# Patient Record
Sex: FEMALE | Race: BLACK OR AFRICAN AMERICAN
[De-identification: names, ages, dates, MRNs, and addresses within clinical notes are randomized per-mention and may not be internally consistent; named-entity substitution may affect disease eponyms.]

---

## 2020-08-18 ENCOUNTER — HOSPITAL ENCOUNTER (EMERGENCY)
Dept: HOSPITAL 88 - FSED | Age: 30
Discharge: HOME | End: 2020-08-18
Payer: SELF-PAY

## 2020-08-18 VITALS — BODY MASS INDEX: 40.48 KG/M2 | HEIGHT: 62 IN | WEIGHT: 220 LBS

## 2020-08-18 DIAGNOSIS — S93.402A: Primary | ICD-10-CM

## 2020-08-18 DIAGNOSIS — S83.92XA: ICD-10-CM
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
## 2020-08-18 DIAGNOSIS — F17.210: ICD-10-CM

## 2020-08-18 DIAGNOSIS — I10: ICD-10-CM
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
## 2020-08-18 DIAGNOSIS — G40.909: ICD-10-CM

## 2020-08-18 PROCEDURE — 99283 EMERGENCY DEPT VISIT LOW MDM: CPT

## 2020-08-18 NOTE — XMS REPORT
Continuity of Care Document

                             Created on: 2020



JERMAINE MARLEY

External Reference #: [id number]

: 1990

Sex: Female



Demographics





                          Address                   [address] 

[place]  [address]

 

                          Home Phone                [phone]

 

                          Preferred Language        English

 

                          Marital Status            Single

 

                          Taoism Affiliation     None

 

                          Race                      

 

                          Ethnic Group              Non-





Author





                          Author                    [facility]

t

 

                          Organization              [facility]

 

                          Address                   [address]

[place]  [address]



 

                          Phone                     Unavailable







Care Team Providers





                    Care Team Member Name Role                Phone

 

                    BRENDEN Sanders Elvin Attphys             [phone]

 

                    Frank Burnett Attphys             [phone]

 

                    OB/MD,  HIGH        Attphys             Unavailable

 

                    WOMENS,  ROOM-1     Attphys             Unavailable

 

                    HPB-MATERNAL,  PHYSICIANS Attphys             Unavailable

 

                    OB/GYN,  ROOM2      Attphys             Unavailable

 

                    BRENDEN Sanders Elvin Admphys             [phone]







Problems





           Condition Name Condition Details Condition Category Status     Onset 

Date Resolution

Date            Last Treatment Date Treating Clinician Comments        Source

 

                          ELEVATED BP                                       ELEV

ATED BP                        Active   

                    2017                                                  
                                             [facility]            
        Diagnosis Active  2017 00:00:00         2017 21:50:00       

          [facility]

 

                          BLOOD PRESSURE                                    BLOO

D PRESSURE                        

Active                        2017                                        
                                                       [facility]  
                  Diagnosis Active    2017 00:00:00           2017 2

3:32:00            

                                        [facility]

 

                    History of High-risk pregnancy History of High-risk pregnanc

y Problem HL7.CCDAR2

          Resolved                                                    [facility]

 

       History of syphilis History of syphilis Problem HL7.CCDAR2 Resolved      

                              

[facility]

 

       Seizure disorder Seizure disorder Problem HL7.CCDAR2 Active              

                      [facility]

 

       Syphilis, antepartum Syphilis, antepartum Problem HL7.CCDAR2 Active      

                              

[facility]

 

       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          

                          

[facility]

 

       Postpartum exam Postpartum exam Problem HL7.CCDAR2 Active                

                    [facility]

 

        Insertion of Nexplanon Insertion of Nexplanon Problem HL7.CCDAR2 Active 

                                 

                                        [facility]

 

       Postop check Postop check Problem HL7.CCDAR2 Active                      

              [facility]

 

                          Pregnancy with abortive outcome (disorder)            

             Pregnancy 

with abortive outcome (disorder)                        Resolved                
                               Problem                        04/10/2017        
                                       [facility]                  
        Problem Resolved                 2017-04-10 01:45:11                 Juan F Avalos

 

                          Finding of increased blood pressure (finding)         

                Finding of

increased blood pressure (finding)                        Active                
                               Problem                        04/10/2017        
                                       [facility]                  
        Problem Active                  2017-04-10 01:45:11                 Neftaly Avalos

 

                          Pre-eclampsia added to pre-existing hypertension (diso

rder)                     

   Pre-eclampsia added to pre-existing hypertension (disorder)                  
     Active                                                Problem              
         04/10/2017                                                [facility]                     Problem Active                  2017-04-10 01

:45:11                 

Kamila Avalos

 

                                        Gestational [pregnancy-induced] hyperten

hang without significant proteinuria, 

unspecified trimester                                           Gestational [pre

gnancy-induced] 

hypertension without significant proteinuria, unspecified trimester             
                                  2017                                    
           04/10/2017                                                [facility]                     Problem                         2017 05

:00:00 2017-04-10 

01:45:11        2017-04-10 01:45:11                                 Kamila so

 

                          Discharge Diagnosis: 33 weeks gestation of pregnancy  

                       

Discharge Diagnosis: 33 weeks gestation of pregnancy                            
                   2017                                                [facility]                     Problem                   2017 06:00:00 2017 03:52:13 

2017 03:52:13                                         Kamila Avalos







History of Past Illness





           Condition Name Condition Details Condition Category Status     Onset 

Date Resolution

Date            Last Treatment Date Treating Clinician Comments        Source

 

                          Patient currently pregnant (finding)                  

       Patient currently 

pregnant (finding)                        Resolved                        
2008                        Problem                        04/10/2017     
                                          [facility]               
             Problem      Resolved     2008 00:00:00 2017-04-10 01:45:11 2

017-04-10 

01:45:11                                                    Kamila Avalos

 

                          Seizure (finding)                                 Seiz

ure (finding)                     

  Resolved                        2004                        Problem     
                  04/10/2017                                                [facility]                     Problem         Resolved        2004 00:00:00 

2017-04-10 01:45:11 2017-04-10 01:45:11                                 Kamila Avalos







Allergies, Adverse Reactions, Alerts

This patient has no known allergies or adverse reactions.



Social History





           Social Habit Start Date Stop Date  Quantity   Comments   Source

 

           Social History 2017 18:49:53 2017 18:49:53               

        Kamila Avalos







                Smoking Status  Start Date      Stop Date       Source

 

                Never smoker                                    [facility]







Medications





             Ordered Medication Name Filled Medication Name Start Date   Stop Da

te    Current 

Medication? Ordering Clinician Indication Dosage     Frequency  Signature (SIG) 

Comments                  Components                Source

 

          ferrous sulfate 325 mg oral enteric coated tablet           2017

 12:51:30           Yes                 

                                 325 mg = 1 tab, PO, Daily, # 60 tab, 3 Refill(s

)                       [facility]

 

       gabapentin 100 MG Oral Capsule        2017 12:51:00        Yes     

                           200 mg = 2 

cap, PO, Q6H, # 20 cap, 0 Refill(s)                                         Neftaly Avalos

 

                Prenatal Multivitamins with Folic Acid 0.4 mg oral tablet       

          2017 12:51:00 

        Yes                                     1 tab, PO, Daily, # 40 tab, 0 Re

fill(s)                 [facility]

 

       ibuprofen 800 mg oral tablet        2017 12:51:00        Yes       

                         800 mg = 1 tab,

PO, Q8H, # 30 tab, 0 Refill(s)                                         [facility]

 

       Docusate Sodium 100 MG Oral Capsule        2017 12:51:00        Yes

                                100 mg =

1 cap, PO, BID, PRN Constipation, # 30 cap, 0 Refill(s)                         

                [facility]

 

                                        Acetaminophen 300 MG / Codeine Phosphate

 30 MG Oral Tablet [Tylenol with Codeine

 #3]          2017 12:51:00        Yes                                2 ta

b, PO, Q6H, # 24 tab, 0 Refill(s)  

                                                    [facility]

 

          ferrous sulfate 325 mg oral enteric coated tablet           2017

 11:17:00           No                   

                                 325 mg = 1 tab, PO, Daily, # 60 tab, 3 Refill(s

)                       [facility]

 

                Prenatal Multivitamins with Folic Acid 0.4 mg oral tablet       

          2017 11:17:00 

        No                                      1 tab, PO, Daily, # 40 tab, 0 Re

fill(s)                 [facility]

 

       ibuprofen 800 mg oral tablet        2017 11:17:00        No        

                         800 mg = 1 tab, 

PO, Q8H, # 30 tab, 0 Refill(s)                                         [facility]

 

       Docusate Sodium 100 MG Oral Capsule        2017 11:17:00        No 

                                100 mg = 

1 cap, PO, BID, PRN Constipation, # 30 cap, 0 Refill(s)                         

                [facility]

 

                                        Acetaminophen 300 MG / Codeine Phosphate

 30 MG Oral Tablet [Tylenol with Codeine

 #3]        2017 11:17:00       No                            2 tab, PO, Q

6H, # 24 tab, 0 Refill(s)       

                                        [facility]

 

                    24 HR tramadol hydrochloride 100 MG Extended Release Tablet 

                    2017 

20:50:00        No                                 Notes: Not to exceed 400mg/da

y. (Same As: Ultram)               

Kamila Avalos

 

       Prenatal Multivitamins oral tablet        2017 14:00:00        No  

                               1 tab, 

Route: PO, Drug Form: TAB, Dosing Weight 95.455, kg, Daily, Start date: 17
9:00:00 CDT, Duration: 30 day, Stop date: 17 9:00:00 CDT                  

                       Kamila Avalos

 

       Ibuprofen        2017 13:00:00        No                           

      Notes: (Same as: Motrin) "Do Not 

Crush"  Take with food.                                         [facility]

 

       Depo-Provera        2017 13:00:00        Yes                       

         Notes: (Same as: Depo-Provera) 

This is NOT Depo-SubQ Provera 104  For IM use only   *** MEDICATION WASTE *** 
Product Size:  150 mg Product Wasted:  ___ mg                                   

      [facility]

 

       Ibuprofen        2017 11:00:00        No                           

      Notes: (Same as: Motrin) "Do Not 

Crush"  Take with food.                                         [facility]

 

                                        Acetaminophen 300 MG / Codeine Phosphate

 30 MG Oral Tablet [Tylenol with Codeine

 #3]            2017 11:00:00         No                                  

    Notes: Do not exceed 4gm/day of 

acetaminophen.  (Same as: Tylenol with Codeine # 3)                             

            Kamila Avalos

 

       Penicillin G        2017 07:00:00        No                        

         2,500,000 unit, 50 mL, Route: 

IVPB, Drug form: INJ, ABXQ4H, Dosing Weight 95.455, kg, Start date: 17 
2:00:00 CDT, Duration: 30 day, Stop date: 17 22:00:00 CDT                 

                        [facility]

 

                                        0.5 ML Bordetella pertussis filamentous 

hemagglutinin vaccine, inactivated 0.01 

MG/ML / Bordetella pertussis fimbriae 2/3 vaccine, inactivated 0.01 MG/ML / 
Bordetella pertussis pertactin vaccine, inactivated 0.006 MG/ML / Bordetella 
pertussis toxoid vacci         2017 06:00:00         No                   

                   Notes: (Tdap ) For 

Adolecent and Adult use For IM Use.  Same as: Adacel (Tdap)                     

                    Kamila Avalos

 

       M-M-R II        2017 06:00:00        No                            

     Notes: (Same as: --R II)  

(measles-mumps-rubella virus vaccine 0.5 ml INJ VL)  WASTE: F/P - Red; E -Red  
GIVE PRIOR TO DISCHARGE                                         Kamial Avalos

 

                    Acetaminophen 325 MG / Hydrocodone Bitartrate 5 MG Oral Tabl

et                     2017 

05:20:00            No                                                Notes: (Sa

me as: Norco 325/5)  Do not exceed 4gm/day of 

acetaminophen.                                              [facility]

 

                    Acetaminophen 325 MG / Hydrocodone Bitartrate 10 MG Oral Tab

let                     2017 

05:20:00            No                                                Notes: Do 

not exceed 4gm/day of acetaminophen.  (Same as: 

Norco 325/10)                                               Kamila vAalos

 

       Bisacodyl        2017 05:20:00        No                           

      Notes: (Same As: Dulcolax, 

Bisco-Lax)                                                  [facility]

 

       Docusate        2017 05:20:00        No                            

     Notes: (Same as: Colace) (Do Not 

Crush)                                                      Kamila Avalos

 

     zolpidem      2017 05:20:00      No                       Notes: (Andrea

e As: Ambien)           

Kamila Avalos

 

       lanolin topical cream        2017 05:20:00        No               

                  1 appl, Route: TOP, 

PRN, Drug form: OINT, PRN Other -See Comment, Start date: 17 0:20:00 CDT, 
Duration: 30 day, Stop date: 17 0:19:00 CDT                               

          [facility]

 

       Benzocaine / Menthol        2017 05:20:00        No                

                 Notes: Cepacol lozenges 

Dispense 1 box = 16 lozenges  (Same As: Cepacol Lozenges)                       

                  [facility]

 

     Simethicone      2017 05:20:00      No                       Notes: (

Same as: Mylicon)           

[facility]

 

       Acetaminophen        2017 05:20:00        No                       

          Notes: Do not exceed 4 gm/day. 

(Same as: Tylenol)                                          Kamila Avalos

 

       NS-Oxytocin 30 units 500 ml 30 unit        2017 05:20:00        No 

                                30 unit, 

500 mL, Rate: 42 ml/hr, Infuse over: 11.9 hr, Dosing Weight 95.455, kg, Route: 
IV, Total Volume: 500 mL, Start date: 17 0:20:00 CDT, Duration: 2 day, 
Stop date: 17 0:19:00 CDT, Replace Every: 11.9 hr                         

                [facility]

 

       Lactated Ringers 1,000 mL        2017 05:20:00        No           

                      1,000 mL, Rate: 100

ml/hr, Infuse over: 10 hr, Route: IV, Dosing Weight 95.455 kg, Total Volume: 
1,000, Start date: 17 0:20:00 CDT, Duration: 30 day, Stop date: 17 
0:19:00 CDT                                                 [facility]

 

                                        Atropine Sulfate 0.025 MG / Diphenoxylat

e Hydrochloride 2.5 MG Oral Tablet 

[Lomotil]         2017 05:16:00         No                                

      Notes: (Same As: Lomotil) MAX Adult

 dose = 8 tabs/day                                          [facility]

 

       morphine Sulfate (GEORGES)        2017 05:14:00        No             

                    Route: INTRATHECAL, 

Drug form: INJ, ONCE, Stop date: 17 0:14:00 CDT                           

              [facility]

 

       fentaNYL (GEORGES)        2017 05:14:00        No                     

            Route: INTRATHECAL, Drug 

form: INJ, ONCE, Stop date: 17 0:14:00 CDT                                

         [facility]

 

       metoclopramide (GEORGES)        2017 05:14:00        No               

                  Route: IV, Drug form: 

INJ, ONCE, Stop date: 17 0:14:00 CDT                                      

   [facility]

 

       famotidine (GEORGES)        2017 05:14:00        No                   

              Route: IV, Drug form: INJ, 

ONCE, Stop date: 17 0:14:00 CDT                                         [facility]

 

       sodium citrate (GEORGES)        2017 05:14:00        No               

                  Route: PO, Drug Form: 

INJ, ONCE, Stop date: 17 0:14:00 CDT                                      

   [facility]

 

     ketOROLAC (GEORGES)      2017 05:14:00      No                       IV,

 ONCE           [facility]

 

       ondansetron (GEORGES)        2017 05:14:00        No                  

               Route: IV, Drug form: INJ,

 ONCE, Stop date: 17 0:14:00 CDT                                         [facility]

 

       acetaminophen (ANES)        2017 05:14:00        No                

                 Route: IV, Drug form: 

INJ, ONCE, Stop date: 17 0:14:00 CDT                                      

   [facility]

 

       bupivacaine 0.75% (ANES)        2017 05:14:00        No            

                     Route: INTRATHECAL, 

Drug Form: INJ, ONCE, Stop date: 17 0:14:00 CDT                           

              [facility]

 

       fentaNYL (ANES)        2017 05:04:00        No                     

            Route: IV, Drug form: INJ, 

ONCE, Stop date: 17 0:04:00 CDT                                         [facility]

 

       carboprost (ANES)        2017 04:59:00        No                   

              Route: IM, Drug form: INJ, 

ONCE, Stop date: 17 23:59:00 CDT                                         [facility]

 

       midazolam (ANES)        2017 04:59:00        No                    

             Route: IV, Drug form: SOLN, 

ONCE, Stop date: 17 23:59:00 CDT                                         [facility]

 

       sodium bicarbonate (ANES)        2017 04:44:00        No           

                      Route: EPIDURAL, 

Drug form: INJ, ONCE, Stop date: 17 23:44:00 CDT                          

               [facility]

 

       [facility]        2017 04:44:00        No                     

            Route: IV, Drug form: INJ, 

ONCE, Stop date: 17 23:44:00 CDT                                         [facility]

 

       lidocaine (ANES)        2017 04:39:00        No                    

             Route: EPIDURAL, Drug form: 

INJ, ONCE, Stop date: 17 23:39:00 CDT                                     

    [facility]

 

       ceFAZolin (ANES)        2017 04:34:00        No                    

             Route: IV, Drug form: INJ, 

ONCE, Stop date: 17 23:34:00 CDT                                         [facility]

 

       Pitocin ([facility]) ([facility]) +        2017 04:20:00        No             

                    Route: IV, Drug form:

 SOLN, Dosing Weight 95.5, kg, Start date: 17 23:20:00 CDT, Stop date: 
17 0:20:00 CDT                                         [facility]

 

     Cefazolin      2017 04:00:00      No                       Notes: Andrea bustamante as: Ancef           [facility] 

[facility]

 

      Azithromycin       2017 04:00:00       No                           

 Notes: (Same As: Zithromax IV)  

                                                    Kam[facility] Avalos

 

       LR 1000 mL INJ (ANES)        2017 03:44:00        No               

                  Route: IV, Total 

Volume: 1,000, Start date: 17 22:44:00 CDT, Stop date: 17 23:44:00 
CDT                                                         [facility]

 

       Ondansetron        2017 03:26:00        No                         

        Notes: (Same as: Zofran)   *** 

MEDICATION WASTE *** Product Size:  4 mg Product Wasted:  ___ mg                

                         [facility]

 

      Morphine       2017 03:26:00       No                            Not

es: (Same as:MORPhine Sulfate)       

                                        [facility]

 

                Penicillin G Potassium [id number] UNT/ML Injectable Solution       

          2017 03:00:00 

           No                                                     Notes: (Same a

s: Pfizerpen)   *** MEDICATION WASTE *** Product 

Size:  5,000,000 unit Product Wasted:  ___ unit                                 

        [facility]

 

                                        Calcium Chloride 0.002 MEQ/ML / Glucose 

50 MG/ML / Potassium Chloride 0.004 

MEQ/ML / Sodium Chloride 0.147 MEQ/ML Injectable Solution                     20

04 22:04:00 

           No                                                     1,000 mL, Rate

: 999 ml/hr, Infuse over: 1 hr, Route: IV, Dosing 

Weight 95.455 kg, Total Volume: 1,000, Start date: 17 17:04:00 CDT, 
Duration: 1 doses or times, Stop date: 17 18:03:00 CDT                    

                     [facility]

 

       influenza virus vaccine, inactivated        2017 14:00:00        Alistair

s                                Notes: 

(Same as: Fluzone Quadrivalent, Fluarix Quadrivalent)  For 3 years of age and 
older (0.5 mL IM) Shake well before use                                         

[facility]

 

       Fioricet        2017 05:51:00        No                            

     Notes: 

(acetaminophen-butalbital-caffeine 325-50-40mg)  Do not exceed 4 gm/day of 
acetaminophen.  (Same as: Esgic, Fioricet)                                      

   Kamila Avalos

 

       Ofirmev        2017 02:24:00        No                             

    Notes: Infuse over 15 minutes  Do not

 exceed 4gm/day of acetaminophen    *** MEDICATION WASTE *** Product Size:  1000
 mg Product Wasted:  ___ mg                                         Kamila so

 

       NS-Oxytocin 30 units 500 ml 30 unit        2017 23:41:00        No 

                                30 unit, 

500 mL, Rate: Titrate, Dosing Weight 95.455, kg, Route: IV, Total Volume: 500 
mL, Start date: 17 18:41:00 CDT, Duration: 2 day, Stop date: 17 
18:40:00 CDT, Replace Every: 24 hr                                         Janette Avalos

 

       oxytocin 30 unit        2017 23:31:00        No                    

             30 unit, 500 mL, Rate: 

Titrate, Dosing Weight 95.455, kg, Route: IV, Total Volume: 500 mL, Start date: 
17 18:31:00 CDT, Duration: 2 day, Stop date: 17 18:30:00 CDT, 
Replace Every: 24 hr                                         [facility]

 

       Famotidine 20 MG Oral Tablet [Pepcid]        2017 22:00:00        N

o                                 Notes: 

(Same as: Pepcid)                                           [facility]

 

       Misoprostol        2017 19:00:00        No                         

        Notes: (Same as:Cytotec)   Take 

with food                                                   [facility]

 

     Carboprost      2017 19:00:00      No                       Notes: (S

maddi As: Hemabate)           

[facility]

 

       Famotidine        2017 19:00:00        No                          

       Notes: (Same as: Pepcid) Can be 

dilute in 5-10cc NS  IVP: Slow IV push over at least 2 minutes.                 

                        [facility]

 

       Citric Acid / sodium citrate        2017 19:00:00        No        

                         Notes: (Same As:

 Bicitra, Cytra-2) Sodium citrate-citric acid (500-334 mg/5 mL): 1 mL contains 
sodium 1 mEq/mL and bicarbonate 1 mEq/mL                                        

 [facility]

 

      Methylergonovine       2017 19:00:00       No                       

     Notes: (Same as:Methergine) 

                                                    [facility]

 

       Misoprostol        2017 18:57:00        No                         

        Notes: (Same as:Cytotec)  Take 

with food  25 microgram = 1/4 tab of 100 microgram.                             

            Kamila Avalos

 

       GI cocktail        2017 18:19:00        No                         

        Notes: G.I. Cocktail = antacid 

with simethicone 22.5 mL - lidocaine viscous 7.5 mL                             

            Kamila Avalos

 

       Ondansetron        2017 18:03:00        No                         

        Notes: (Same as: Zofran)   *** 

MEDICATION WASTE *** Product Size:  4 mg Product Wasted:  ___ mg                

                         Kamila Avalos

 

       Tramadol        2017 18:03:00        No                            

     Notes: Not to exceed 400mg/day. 

(Same As: Ultram)                                           Kamila Avalos

 

       Ibuprofen        2017 18:03:00        No                           

      Notes: (Same as: Motrin) "Do Not 

Crush"  Take with food.                                         Kamila Avalos

 

       Butorphanol        2017 18:03:00        No                         

        Notes: (Same As: Stadol)   *** 

MEDICATION WASTE *** Product Size:  2 mg Product Wasted:  ___ mg                

                         Kamila Avalos

 

           Lidocaine Hydrochloride 10 MG/ML Injectable Solution             18:03:00            No         

                                        Notes: (Same as: Xylocaine)             

        Kamila Avalos

 

       Terbutaline        2017 18:03:00        No                         

        Notes: **DO NOT USE IN OB/GYN 

AREA**  (Same As: Brethine)                                         Kamila so

 

       Lactated Ringers 1,000 mL        2017 18:03:00        No           

                      1,000 mL, Rate: 125

 ml/hr, Infuse over: 8 hr, Route: IV, Dosing Weight 95.455 kg, Total Volume: 
1,000, Start date: 17 13:03:00 CDT, Duration: 30 day, Stop date: 17 
13:02:00 CDT                                                Kamila Avalos

 

       NS-Oxytocin 30 units 500 ml 30 unit        2017 18:03:00        No 

                                30 unit, 

500 mL, Rate: 42 ml/hr, Infuse over: 11.9 hr, Dosing Weight 95.455, kg, Route: 
IV, Total Volume: 500 mL, Start date: 17 13:03:00 CDT, Duration: 2 day, 
Stop date: 17 13:02:00 CDT, Replace Every: 11.9 hr                        

                 [facility]

 

                                        Calcium Chloride 0.0014 MEQ/ML / Potassi

um Chloride 0.004 MEQ/ML / Sodium 

Chloride 0.103 MEQ/ML / Sodium Lactate 0.028 MEQ/ML Injectable Solution         

                  

2017 18:03:00           No                                                

1,000 mL, 1,000 ml/hr, Infuse Over: 1 hr, 

Route: IV, 1,000, Drug form: INJ, ONCE, Dosing Weight 95.455 kg, Start date: 
17 13:03:00 CDT, Stop date: 17 13:03:00 CDT, Bolus for regional anes
thesia per unit protocol                                         Kamila valverde

 

       Fioricet        2017 05:11:00        No                            

     Notes: 

(acetaminophen-butalbital-caffeine 325-50-40mg)  Do not exceed 4 gm/day of 
acetaminophen.  (Same as: Esgic, Fioricet)                                      

   [facility]

 

                                        Calcium Chloride 0.0014 MEQ/ML / Potassi

um Chloride 0.004 MEQ/ML / Sodium 

Chloride 0.103 MEQ/ML / Sodium Lactate 0.028 MEQ/ML Injectable Solution         

                  

2017 05:10:00           No                                                

1,000 mL, 1,000 ml/hr, Infuse Over: 1 hr, 

Route: IV, ONCE, Priority: STAT, Dosing Weight 92.727 kg, Start date: 17 
23:10:00 CST, Duration: 1 doses or times, Stop date: 17 23:10:00 CST      

                                   

[facility]







Immunizations





           Ordered Immunization Name Filled Immunization Name Date       Status 

    Comments   Source

 

           Tdap (Adacel)            Unknown    Completed             [facility]







Vital Signs





             Vital Name   Observation Time Observation Value Comments     Source

 

             Systolic (mm Hg) 2017 13:34:00                           Neftaly

fide Avalos

 

             Diastolic (mm Hg) 2017 13:34:00                           [facility]

 

             Respitory Rate 2017 13:34:00                           [facility]

 

             Temperature Oral (F) 2017 13:34:00 98.3 F                    

[facility]

 

             Heart Rate   2017 13:34:00                           [facility]

 

             Heart Rate   2017 09:28:00                           [facility]

 [place]

 

             Respitory Rate 2017 09:28:00                           [facility]

[facility]

 

             Systolic (mm Hg) 2017 09:28:00                           [facility]

[facility] [place]

 

             Diastolic (mm Hg) 2017 09:28:00                           [facility]

[facility]

 

             Temperature Oral (F) 2017 09:28:00 98.7 F                    

[facility]

 

             Temperature Oral (F) 2017 05:28:00 98.2 F                    

[facility]

 

             Heart Rate   2017 05:28:00                           [facility]

 [place]

 

             Respitory Rate 2017 05:28:00                           [facility]

[facility]

 

             Systolic (mm Hg) 2017 05:28:00                           [facility]

[facility] [place]

 

             Diastolic (mm Hg) 2017 05:28:00                           [facility]

 

             Weight       2017 02:14:00                           [facility]

 

             Height       2017 02:14:00 157.48 cm                 [facility]

 

             BMI Calculated 2017 02:14:00                           [facility] [place]

 

             Weight       2017 15:47:00                           [facility]

 

             BMI Calculated 2017 15:47:00                           [facility]

[facility]

 

             Height       2017 15:47:00 157.48 cm                 [facility]

 

             Systolic (mm Hg) 2017 06:08:00                           [facility]

[facility]

 

             Diastolic (mm Hg) 2017 06:08:00                           [facility]

 

             Systolic (mm Hg) 2017 05:43:00                           [facility]

[facility] [place]

 

             Diastolic (mm Hg) 2017 05:43:00                           [facility]

 

             Systolic (mm Hg) 2017 05:25:00                           [facility]

[facility] [place]

 

             Diastolic (mm Hg) 2017 05:25:00                           [facility]

[facility]

 

             BMI Calculated 2017 04:53:00                           [facility]

[facility]

 

             Weight       2017 04:53:00                           [facility]

 

             Height       2017 04:53:00 157.48 cm                 [facility]

 [place]

 

             Temperature Oral (F) 2017 04:53:00 98.4 F                    

[facility]

 

             Respitory Rate 2017 04:53:00                           [facility]

[facility]

 

             Heart Rate   2017 04:53:00                           [facility]

 [place]







Procedures





                Procedure       Date / Time Performed Performing Clinician [facility]

e

 

                History of  Section                                 [facility]







Encounters





             Start Date/Time End Date/Time Encounter Type Admission Type Attendi

[facility]   Care Department Encounter ID    Source

 

           2017 10:40:00 2017 13:43:00 Outpatient            Priyank [facility]               [id number]         

 

          2017 22:42:00 2017 00:30:00 Outpatient           Jong Burnett [facility]                     [id number]               

 

           2017 08:30:00 2017 08:30:00 Appointment; OB/MD, HIGH     

        OB/MD, HIGH 

UTP                 UTP                 [id number]            [facility]

 

           2017-01-10 08:00:00 2017-01-10 08:00:00 Appointment; OB/MD, HIGH     

        OB/MD, HIGH 

UTP                 UTP                 [id number]            [facility]

 

             2017 10:00:00 2017 10:00:00 Appointment; WOMENS, ROOM-1

               WOMENS, 

ROOM-1          UTP             UTP             [id number]        [facility]

 

           2017 08:00:00 2017 08:00:00 Appointment; OB/MD, HIGH     

        OB/MD, HIGH 

UTP                 UTP                 [id number]            [facility]

 

                2016-12-15 13:00:00 2016-12-15 13:00:00 Appointment; HPB-MATERNA

L, PHYSICIANS   

             HPB-MATERNAL, PHYSICIANS UTP          UTP          [id number]     [facility]

 

             2016-12-15 11:15:00 2016-12-15 11:15:00 Appointment; OB/GYN, ROOM2 

              OB/GYN, 

ROOM2           UTP             UTP             [id number]        [facility]







Results





           Test Description Test Time  Test Comments Results    Result Comments 

Source

 

           HEMATOLOGY 2017 10:36:00            8.5                   [facility]

[facility]

 

           HEMATOLOGY 2017 10:36:00            26.5                  Janette Avalos

 

           BLOOD BANK RESULTS 2017 12:50:00            Negative (17 7:

50 AM)            [facility] 

[facility]

 

           BLOOD BANK RESULTS 2017 18:14:00            Negative (4/3/17 1:

14 PM)            [facility] 

[place]

 

           DRUG SCREEN 2017 18:14:00            See Note (4/3/17 1:14 PM) 

           [facility]

 

           DRUG SCREEN 2017 18:14:00            Negative *NA*(4/3/17 1:14 

PM)            [facility] 

[place]

 

           DRUG SCREEN 2017 18:14:00            Negative *NA*(4/3/17 1:14 

PM)            [facility]

 

           DRUG SCREEN 2017 18:14:00            Negative *NA*(4/3/17 1:14 

PM)            [facility]

 

           DRUG SCREEN 2017 18:14:00            Negative *NA*(4/3/17 1:14 

PM)            [facility] 

[place]

 

           DRUG SCREEN 2017 18:14:00            Negative *NA*(4/3/17 1:14 

PM)            [facility]

 

           DRUG SCREEN 2017 18:14:00            Negative *NA*(4/3/17 1:14 

PM)            [facility]

 

           DRUG SCREEN 2017 18:14:00            Positive *ABN*(4/3/17 1:14

 PM)            [facility]

 

           IMMUNOLOGY 2017 18:14:00            Negative *NA*(4/3/17 1:14 P

M)            [facility]

 

           IMMUNOLOGY 2017 17:22:00            Non Reactive (4/3/17 12:22 

PM)            [facility]

 

           IMMUNOLOGY 2017 17:22:00            Reactive *ABN*(4/3/17 12:22

 PM)            [facility]

 

           IMMUNOLOGY 2017 17:22:00            Negative *NA*(4/3/17 12:22 

PM)            [facility]

 

           IMMUNOLOGY 2017 17:22:00            Reactive *ABN*(4/3/17 12:22

 PM)            [facility] 

[place]

 

           CHEM PANEL 2017 16:21:00            200                   [facility] [place]

 

           CHEM PANEL 2017 16:21:00            153                   [facility] [place]

 

           CHEM PANEL 2017 16:21:00            0.50                  [facility] [place]

 

           CHEM PANEL 2017 16:21:00            15                    [facility] [place]

 

           CHEM PANEL 2017 16:21:00            13                    [facility]

 

           CHEM PANEL 2017 16:21:00            3.0                   [facility]

 

           HEMATOLOGY 2017 16:21:00            0.5                   [facility] [place]

 

           HEMATOLOGY 2017 16:21:00            0.1                   [facility]

 

           HEMATOLOGY 2017 16:21:00            6.9                   [facility]

 

           HEMATOLOGY 2017 16:21:00            1.5                   [facility]

 

           HEMATOLOGY 2017 16:21:00            5.1                   [facility]

 

           HEMATOLOGY 2017 16:21:00            0.3                   [facility]

 

           HEMATOLOGY 2017 16:21:00            1.0                   [facility] [place]

 

           HEMATOLOGY 2017 16:21:00            21.1                  [facility] [place]

 

           HEMATOLOGY 2017 16:21:00            70.7                  [facility] [place]

 

           HEMATOLOGY 2017 16:21:00            9.6                   [facility] [place]

 

           HEMATOLOGY 2017 16:21:00            150                   [facility] [place]

 

                    HEMATOLOGY          2017 16:21:00   

 

                                        Test Item

 

             MCH (test code = MCH) 26.2 pg      27.0-31.0                  





[facility] VtuuhhxAIHJKECTRH7845-91-02 16:21:0082.2[facility]annHEMATOLOGY
2017 16:21:0015.9[facility] RlqnlekGUGGPIWPPJ9884-60-51 16:21:0031.9[facility]
 AsfemndCBJRKLPFIC7125-12-86 16:21:0034.0[facility] VjyjmqcUAJKPWCPRW2598-75-14 
16:21:0010.8[facility] OxufuaqRNENRQAIWX7992-62-23 16:21:004.14[facility]
YPUKVFMYPX7657-70-62 16:21:007.2[facility] HermannURINE QNIF2504-76-98 16:21:000.2
[facility] HermannURINE FDIS6317-40-69 16:21:0031.30[facility]ann[facility]INE CHEM
2017 16:21:005.4[facility] HermannCHEM TUNWH1503-52-93 05:18:0015[facility] 
HermannCHEM FUEYK2709-22-96 05:18:45015Hjxzuiau HermannCHEM DVVOI4035-05-33 
05:18:002.3[facility] HermannCHEM MLGRB8539-02-28 05:18:000.47[facility] HermannCHEM
 VKATH1908-60-93 05:18:35812Iokgdmbj HermannCHEM KEVWS8763-54-23 05:18:0013
[facility] EoqpjemNANVHYHAZG9017-41-63 05:18:000.2[facility] HermannHEMATOLOGY
2017 05:18:000.1[facility] PtenvqnZICSTJOBWO8016-97-42 05:18:003.0[facility] 
QxxaxftKEYURTKWJC0231-82-30 05:18:000.9[facility] EvqsqwyLHNWZASYGY5928-52-46 
05:18:002.0[facility] HgnwdfbMCROPLICOU1078-98-07 05:18:0065.0[facility]
HEDZIUVLDD0149-90-10 05:18:0024.3[facility] BkbiohvGPZBKACQHO4020-55-21 05:18:00
8.1[facility] VhycrosZGWXKVVWTE7616-49-76 05:18:001.2[facility] HermannHEMATOLOGY
2017 05:18:007.5[facility] GkmyvgnSWXBHSJEWG2679-33-88 05:18:0033.1[facility] 
TowunhyCOXXIECPCH5549-48-30 05:18:0015.0[facility] SuaxfuuPZGJCAKPGP5241-99-31 
05:18:009.9[facility] FuoejkgFPBCLAKVAB0296-97-36 05:18:00* 



             Test Item    Value        Reference Range Interpretation Comments

 

             MCH (test code = MCH) 27.8 pg      27.0-31.0                  





[facility] QdrlckxRAVGYWUVPL8613-61-07 05:18:003.95[facility] HermannHEMATOLOGY
2017 05:18:0012.4[facility] ZaspkhrKWEDJMYCMF5260-60-79 05:18:0011.0[facility]
 HrsqwzlASBUZDRKVC3373-17-21 05:18:0033.1[facility] CzxhrxlZTELWODTIX5338-37-75 
05:18:0083.8[facility] FfeguahPRSQKGEBWY7408-38-08 05:18:28188Usthqsfj [facility]

## 2020-08-18 NOTE — DIAGNOSTIC IMAGING REPORT
Radiographs of the left ankle - 3 views



HISTORY:  Pain

COMPARISON: None available.

     

FINDINGS:

Bones:

No acute displaced fracture.   Small accessory navicular bone.

Osseous alignment is within normal limits.



Joints:

Scattered degenerative change. No osseous erosion. 



Soft tissues:

Mild soft tissue swelling





IMPRESSION: 

Scattered degenerative change. No osseous erosion. 

Mild soft tissue swelling.



Signed by: Dr. Ruddy Monaco M.D. on 8/18/2020 12:56 PM

## 2020-08-18 NOTE — XMS REPORT
Summary of Care: 17 - 17

                             Created on: 2044



JERMAINE MARLEY

External Reference #: [id number]

: 1990

Sex: Female



Demographics





                          Address                   [address] 

[place]  [address]-

 

                          Home Phone                [phone]

 

                          Preferred Language        English

 

                          Marital Status            Single

 

                          Oriental orthodox Affiliation     None

 

                          Race                      

 

                          Ethnic Group              Non-





Author





                          Author                    [facility]

 

                          Organization              [facility]

 

                          Address                   Unknown

 

                          Phone                     Unavailable







Encounter





DIEGO Hemphill(FIN) [id number] Date(s): 17 - 17

[facility] [address] [facility] provided by         
  [employer]       at [facility] [place] [address]-    
[phone]

Discharge Diagnosis: 33 weeks gestation of pregnancy

Discharge Disposition: Home or Self Care

Attending Physician: Noemí Burnett MD





Vital Signs





                    1                   2                   3



                                         Most recent to   



                                         oldest [Reference   



                                         Range]:   

 

                                        157.48 cm 

                    (17 10:53 PM)                       



                                         Height   

 

                                        98.4 DegF 

                    (17 10:53 PM)                       



                                         Temperature Oral   



                                         [96.4-99.1 DegF]   

 

                                        106/63 mmHg 

                          (17 12:08 AM)        112/71 mmHg 

                          (17 11:43 PM)        122/69 mmHg 

                                        (17 11:25 PM)



                                         Blood Pressure   



                                         [/60-90 mmHg]   

 

                                        20 BRMIN 

                    (17 10:53 PM)                       



                                         Respiratory Rate   



                                         [14-20 BRMIN]   

 

                                        122 bpm 

*HI*

                    (17 10:53 PM)                       



                                         Peripheral Pulse   



                                         Rate [ bpm]   

 

                                        92.727 kg 

                    (17 10:53 PM)                       



                                         Weight   

 

                                        37.39 m2 

                    (17 10:53 PM)                       



                                         Body Mass Index   







Problem List





    



              Condition    Effective Dates  Status       Health Status  Informan

t

 

    



                           (Confirmed)       Resolved  

 

    



                     Pregnant(Confirmed)  16              Active  

 

    



                           Blood pressure            Active  



                                         elevated(Confirmed)    







Allergies, Adverse Reactions, Alerts





   



                 Substance       Reaction        Severity        Status

 

   



                           NKDA                      Active







Medications





Fioricet

2 tab, Route: PO, Drug Form: TAB, Dosing Weight 92.727, kg, ONCE, STAT, Start da
te: 17 23:11:00 CST, Stop date: 17 23:11:00 CST

Notes: (acetaminophen-butalbital-caffeine 325-50-40mg)  Do not exceed 4 gm/day o
f acetaminophen.  (Same as: Esgic, Fioricet)

Start Date: 17

Stop Date: 17

Status: Completed



Lactated Ringers (Bolus) IV

1,000 mL, 1,000 ml/hr, Infuse Over: 1 hr, Route: IV, ONCE, Priority: STAT, Dosin
g Weight 92.727 kg, Start date: 17 23:10:00 CST, Duration: 1 doses or time
s, Stop date: 17 23:10:00 CST

Start Date: 17

Stop Date: 17

Status: Completed



Results





CHEM PANEL



 



                           Most recent to            1



                                         oldest [Reference 



                                         Range]: 

 

 



                           Creatinine Lvl            0.47 mg/dL



                           [0.50-1.40 mg/dL]         *LOW*



                                         (17 11:18 PM)

 

 



                           eGFR                      156 mL/min/1.73m2 1



                                         *NA*



                                         (17 11:18 PM)

 

 



                           Uric Acid [2.5-7.0        2.3 mg/dL



                           mg/dL]                    *LOW*



                                         (17 11:18 PM)

 

 



                           ALT [0-65 unit/L]         15 unit/L



                                         (17 11:18 PM)

 

 



                           AST [0-37 unit/L]         13 unit/L



                                         (17 11:18 PM)

 

 



                           LDH [ unit/L]       228 unit/L



                                         *HI*



                                         (17 11:18 PM)







1Result Comment: The eGFR is calculated using the CKD-EPI formula. In most 
young, healthy individuals the eGFR will be >90 mL/min/1.73m2. The eGFR declines
with age. An eGFR of 60-89 may be normal in some populations, particularly the 
elderly, for whom the CKD-EPI formula has not been extensively validated. Use of
the eGFR is not recommended in the following populations:



Individuals with unstable creatinine concentrations, including pregnant patients
and those with serious co-morbid conditions.



Patients with extremes in muscle mass or diet. 



The data above are obtained from the National Kidney Disease Education Program (
NKDEP) which additionally recommends that when the eGFR is used in patients with
extremes of body mass index for purposes of drug dosing, the eGFR should be mul
tiplied by the estimated BMI.



HEMATOLOGY



 



                           Most recent to            1



                                         oldest [Reference 



                                         Range]: 

 

 



                           WBC [3.7-10.4 K/CMM]      12.4 K/CMM



                                         *HI*



                                         (17:18 PM)

 

 



                           RBC [4.20-5.40            3.95 M/CMM



                           M/CMM]                    *LOW*



                                         (17:18 PM)

 

 



                           Hgb [12.0-16.0 g/dL]      11.0 g/dL



                                         *LOW*



                                         (17:18 PM)

 

 



                           Hct [36.0-48.0 %]         33.1 %



                                         *LOW*



                                         (17:18 PM)

 

 



                           MCV [80.0-98.0 fL]        83.8 fL



                                         (17:18 PM)

 

 



                           MCH [27.0-31.0 pg]        27.8 pg



                                         (17:18 PM)

 

 



                           MCHC [32.0-36.0           33.1 g/dL



                           g/dL]                     (17:18 PM)

 

 



                           RDW [11.5-14.5 %]         15.0 %



                                         *HI*



                                         (17:18 PM)

 

 



                           Platelet [133-450         218 K/CMM



                           K/CMM]                    (17:18 PM)

 

 



                           MPV [7.4-10.4 fL]         9.9 fL



                                         (17:18 PM)

 

 



                           Segs [45.0-75.0 %]        65.0 %



                                         (17:18 PM)

 

 



                           Lymphocytes               24.3 %



                           [20.0-40.0 %]             (17:18 PM)

 

 



                           Monocytes [2.0-12.0       7.5 %



                           %]                        (17 11:18 PM)

 

 



                           Eosinophils [0.0-4.0      2.0 %



                           %]                        (17 11:18 PM)

 

 



                           Basophils [0.0-1.0        1.2 %



                           %]                        *HI*



                                         (17:18 PM)

 

 



                           Segs-Bands #              8.1 K/CMM



                           [1.5-8.1 K/CMM]           (17 11:18 PM)

 

 



                           Lymphocytes #             3.0 K/CMM



                           [1.0-5.5 K/CMM]           (17 11:18 PM)

 

 



                           Monocytes # [0.0-0.8      0.9 K/CMM



                           K/CMM]                    *HI*



                                         (17 11:18 PM)

 

 



                           Eosinophils #             0.2 K/CMM



                           [0.0-0.5 K/CMM]           (17 11:18 PM)

 

 



                           Basophils # [0.0-0.2      0.1 K/CMM



                           K/CMM]                    (17 11:18 PM)







Immunizations





No data available for this section



Procedures





No data available for this section



Social History





 



                           Social History Type       Response

 

 



                           Smoking Status            Former smoker; Type: [employer]

ttes; Started at age: 17.0; Stopped 

at age: 25;



                                         Exposure to Tobacco Smoke None; Cigaret

te Smoking Last 365 Days No; Reg



                                         Smoking Cessation Counseling No







Assessment and Plan





No data available for this section

## 2020-08-18 NOTE — XMS REPORT
Summary of Care

                             Created on: 2018



JERMAINE MARLEY

External Reference #: [id number]

: 1990

Sex: Female



Demographics





                          Address                   [address] [place] [address] [place]  [address]

 

                          Preferred Language        English

 

                          Marital Status            Unknown

 

                          Taoism Affiliation     Unknown

 

                          Race                      Other Race

 

                          Ethnic Group              Non-





Author





                          Author                    [facility]

 

                          Organization              Unknown

 

                          Address                   Unknown

 

                          Phone                     Unavailable







Care Team Providers





                    Care Team Member Name Role                Phone

 

                    YANI HAGAN MD Unavailable         Unavailable

 

                    MICAH HOLDEN MD Unavailable         Unavailable

 

                          Unavailable               Unavailable







Functional Status





                    Name                Dates               Details

 

                                        Functional status health issues are not 

documented

                                                    Status: 







                    Name                Dates               Details

 

                                        Cognitive status health issues are not d

ocumented

                                                    Status: 







Problems





                    Name                Dates               Details

 

                                        Seizure disorder (345.90, G40.909) 

                                                    Status: Active

 

                                        Syphilis, antepartum (647.03, O98.119) 

                                                    Status: Active

 

                                        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 

                                                    Status: Active

 

                                        Postpartum exam (V24.2, Z39.2) 

                                                    Status: Active

 

                                        Insertion of Nexplanon (V25.5, Z30.017) 

                                                    Status: Active

 

                                        Postop check (V67.00, Z09) 

                                                    Status: Active







Medications





                    Name                Dates               Details

 

                                        No Reported Medications



 











         R.N.      Active





Allergies and Adverse Reactions





                    Name                Dates               Details

 

                    No Known Allergies (Allergy)                     Status: Act

leela









Past Medical History





                    Name                Dates               Details

 

                                        History of High-risk pregnancy (V23.9, O

09.90) 

                                                    Status: Resolved

 

                                        History of syphilis (V12.09, Z86.19) 

                                                    Status: Resolved







Procedures





                    Procedure           Dates               Details

 

                    History of  Section                     Completed 









Immunization





                    Name                Dates               Details

 

                                        Tdap (Adacel)

                          on: 2017               







Social History





                    Name                Dates               Details

 

                                        -

                                                    Status: 







                    Name                Dates               Details

 

                    Never smoker                             







Vital Signs





                Date            Test            Result          Details

 

                                                                 

 

                    No Known Vitals to report                      







Results





                Date            Description     Value           Details

 

                    Results not documented                      

 

                                                                 







Plan of Care





                    Name                Dates               Details

 

                                        Planned Observations 

 

                    Planned Goals not documented                      







Interventions Provided

Labs/Procedures/Imaging* . [facility] - PAP w/reflex HPV; Done: 2017





Instructions





                    Name                Dates               Details

 

                                        Instructions not documented

                                                     







Encounters





                                        Appointment; OB/GYN, ROOM2 

Encounter Diagnosis: Problem not documented

                                        On: 15-Dec-2016 11:15



 

                                        Appointment; [facility] 

Encounter Diagnosis: Problem not documented

                                        On: 15-Dec-2016 13:00



 

                                        Appointment; OB/MD, [facility] 

Encounter Diagnosis: Problem not documented

                                        On: 3-Thom-2017 8:00



 

                                        Appointment; [facility], ROOM-1 

Encounter Diagnosis: Problem not documented

                                        On: 2017 10:00



 

                                        Appointment; OB/MD, [facility] 

Encounter Diagnosis: Problem not documented

                                        On: 10-Thom-2017 8:00



 

                                        Appointment; OB/MD, [facility] 

Encounter Diagnosis: Problem not documented

                                        On: 2017 8:30

## 2020-08-18 NOTE — XMS REPORT
Continuity of Care Document

                             Created on: 2020



SHELLIE FERNANDES##

External Reference #: [id number]

: 1990

Sex: Female



Demographics





                          Address                   [address] [place]  [address]

 

                          Home Phone                [phone]

 

                          Preferred Language        English

 

                          Marital Status            Unknown

 

                          Yazdanism Affiliation     Unknown

 

                          Race                      Unknown

 

                          Ethnic Group              Unknown





Author





                          Author                    Kamila Avalos Information

 [facility]##

 

                          Organization              [employer]

 Exchange

 

                          Address                   Unknown

 

                          Phone                     Unavailable







Care Team Providers





                    Care Team Member Name Role                Phone

 

                    [facility] [employer] Unavailable         Un

available



                                    



Problems

                    



                    Problem                         Status                      

   Onset Date       

                          Classification                         Date Reported  

         

                          Comments                         Source               

     

 

                                        Gestational [pregnancy-induced] hyperten

hang without significant proteinuria, 

unspecified trimester                                                   

2017                                                   04/10/2017         

 

                                                    [facility]     

             

 

 

                    ELEVATED BP                         Active                  

       2017   

                                                                                

                                        [facility]                 

   

 

                          Discharge Diagnosis: 33 weeks gestation of pregnancy  

                          

                          2017                                                  

[facility]                    

 

                    BLOOD PRESSURE                         Active               

          2017

                                                                                

                                        [facility]                 

   

 

                          Patient currently pregnant (finding)                  

       Resolved           

                    2008                         Problem                  

       

04/10/2017                                                   [facility]                    

 

                    Seizure (finding)                         Resolved          

               

2004                         Problem                         04/10/2017   

                                                    [facility]     

      

        

 

                          Pregnancy with abortive outcome (disorder)            

             Resolved     

                                             Problem                         

04/10/2017                                                   [facility]                    

 

                          Finding of increased blood pressure (finding)         

                Active    

                                             Problem                         

04/10/2017                                                   [facility]                    

 

                                        Pre-eclampsia added to pre-existing hype

rtension (disorder)                     

                    Active                                                  Prob

mark              

                    04/10/2017                                                  

[facility]                    



                                                                                
                                                                                
                                      



Medications

                    



                    Medication                         Details                  

       Route        

                          Status                         Patient Instructions   

          

                          Ordering Provider                         Order Date  

               

                                        Source                    

 

                          ferrous sulfate 325 mg oral enteric coated tablet     

                    325 mg

= 1 tab, PO, Daily, # 60 tab, 3 Refill(s)                                       

                    Active                                                      

          

                          2017                         [facility]

nter            

       

 

                          gabapentin 100 MG Oral Capsule                        

 200 mg = 2 cap, PO, Q6H, 

# 20 cap, 0 Refill(s)                                                   Active  

 

                                                                       

2017                              [facility]                 

   

 

                                        Prenatal Multivitamins with Folic Acid 0

.4 mg oral tablet                       

                          1 tab, PO, Daily, # 40 tab, 0 Refill(s)               

                         

                    Active                                                      

           

                          2017                         [facility]

nter             

      

 

                          ibuprofen 800 mg oral tablet                         8

00 mg = 1 tab, PO, Q8H, # 

30 tab, 0 Refill(s)                                                   Active    

 

                                                                      2017

 

                                        [facility]                 

   

 

                          Docusate Sodium 100 MG Oral Capsule                   

      100 mg = 1 cap, PO, 

BID, PRN Constipation, # 30 cap, 0 Refill(s)                                    

                    Active                                                      

       

                          2017                         [facility]

nt         

          

 

                                        Acetaminophen 300 MG / Codeine Phosphate

 30 MG Oral Tablet [Tylenol with Codeine

 #3]                                    2 tab, PO, Q6H, # 24 tab, 0 Refill(s)   

           

                                             Active                             

          

                                             2017                         

[facility]                    

 

                          ferrous sulfate 325 mg oral enteric coated tablet     

                    325 mg

= 1 tab, PO, Daily, # 60 tab, 3 Refill(s)                                       

                    Inactive                                                    

          

                          2017                         [facility]

nter          

         

 

                                        Prenatal Multivitamins with Folic Acid 0

.4 mg oral tablet                       

                          1 tab, PO, Daily, # 40 tab, 0 Refill(s)               

                         

                    Inactive                                                    

           

                          2017                         [facility]

nt           

        

 

                          ibuprofen 800 mg oral tablet                         8

00 mg = 1 tab, PO, Q8H, # 

30 tab, 0 Refill(s)                                                   Inactive  

 

                                                                       

2017                              [facility]                 

   

 

                          Docusate Sodium 100 MG Oral Capsule                   

      100 mg = 1 cap, PO, 

BID, PRN Constipation, # 30 cap, 0 Refill(s)                                    

                    Inactive                                                    

       

                          2017                         [facility]

nt       

            

 

                                        Acetaminophen 300 MG / Codeine Phosphate

 30 MG Oral Tablet [Tylenol with Codeine

 #3]                                    2 tab, PO, Q6H, # 24 tab, 0 Refill(s)   

           

                                             Inactive                           

          

                                             2017                         

[facility]                    

 

                                        24 HR tramadol hydrochloride 100 MG Exte

nded Release Tablet                     

                          Notes: Not to exceed 400mg/day. (Same As: Ultram)     

                       

                     Inactive                                                   

                          2017                         [facility]                    

 

                          Prenatal Multivitamins oral tablet                    

     1 tab, Route: PO, 

Drug Form: TAB, Dosing Weight 95.455, kg, Daily, Start date: 17 9:00:00 
CDT, Duration: 30 day, Stop date: 17 9:00:00 CDT                          

                          No Longer Active                                      

   

                                             2017                         

[facility]                    

 

                          Ibuprofen                         Notes: (Same as: Mot

rin) "Do Not Crush"  Take 

with food.                                                   No Longer Active   

 

                                                                      2017

                                        [facility]                 

   

 

                          Depo-Provera                         Notes: (Same as: 

Depo-Provera) This is NOT 

Depo-SubQ Provera 104  For IM use only   *** MEDICATION WASTE *** Product Size: 
150 mg Product Wasted:  ___ mg                                                  

 

Inactive                                                                        

 

                          2017                         [facility]

nt                    

 

                          Ibuprofen                         Notes: (Same as: Mot

rin) "Do Not Crush"  Take 

with food.                                                   Inactive           

 

                                                                      2017

        

                                        [facility]                 

   

 

                                        Acetaminophen 300 MG / Codeine Phosphate

 30 MG Oral Tablet [Tylenol with Codeine

 #3]                                    Notes: Do not exceed 4gm/day of acetamin

ophen.  

(Same as: Tylenol with Codeine # 3)                                             

                    No Longer Active                                            

                

                          2017                         [facility]

nter        

           

 

                          Penicillin G                         2,500,000 unit, 5

0 mL, Route: IVPB, Drug 

form: INJ, ABXQ4H, Dosing Weight 95.455, kg, Start date: 17 2:00:00 CDT, 
Duration: 30 day, Stop date: 17 22:00:00 CDT                              

                    Inactive                                                    

 

                          2017                         [facility]

nter 

                  

 

                                        0.5 ML Bordetella pertussis filamentous 

hemagglutinin vaccine, inactivated 0.01 

MG/ML / Bordetella pertussis fimbriae 2/3 vaccine, inactivated 0.01 MG/ML / 
Bordetella pertussis pertactin vaccine, inactivated 0.006 MG/ML / Bordetella 
pertussis toxoid vacci                         Notes: (Tdap ) For Adolecent and 

Adult use For IM Use.  Same as: Adacel (Tdap)                                   

                    No Longer Active                                            

      

                          2017                         [facility]                    

 

                          M-M-R II                         Notes: (Same as: M-M-

R II)  

(measles-mumps-rubella virus vaccine 0.5 ml INJ VL)  WASTE: F/P - Red; E -Red  
GIVE PRIOR TO DISCHARGE                                                   No 

Longer Active                                                                   

 

                          2017                         [facility]

nter               

    

 

                                        Acetaminophen 325 MG / Hydrocodone Donna

trate 5 MG Oral Tablet                  

                                        Notes: (Same as: Norco 325/5)  Do not ex

ceed 4gm/day of acetaminophen.    

                                             Inactive                           

                                                    2017                  

     

                                        [facility]                 

   

 

                                        Acetaminophen 325 MG / Hydrocodone Donna

trate 10 MG Oral Tablet                 

                                        Notes: Do not exceed 4gm/day of acetamin

ophen.  (Same as: Norco 325/10)  

                                               Inactive                         

                                                    2017                  

   

                                        [facility]                 

   

 

                          Bisacodyl                         Notes: (Same As: Dul

colax, Bisco-Lax)         

                                                    No Longer Active            

            

                                                                      2017

                    

                                        [facility]                 

   

 

                          Docusate                         Notes: (Same as: Cola

ce) (Do Not Crush)        

                                                    No Longer Active            

           

                                                                      2017

                   

                                        [facility]                 

   

 

                          zolpidem                         Notes: (Same As: Ambi

en)                       

                                             No Longer Active                   

                   

                                             2017                         

[facility]                    

 

                          lanolin topical cream                         1 appl, 

Route: TOP, PRN, Drug 

form: OINT, PRN Other -See Comment, Start date: 17 0:20:00 CDT, Duration: 
30 day, Stop date: 17 0:19:00 CDT                                         

                    No Longer Active                                            

            

                          2017                         [facility]

nter    

               

 

                          Benzocaine / Menthol                         Notes: Ce

pacol lozenges  Dispense 1

box = 16 lozenges  (Same As: Cepacol Lozenges)                                  

                          No Longer Active                                      

           

                                             2017                         

[facility]                    

 

                          Simethicone                         Notes: (Same as: M

ylicon)                   

                                             No Longer Active                   

               

                                             2017                         

[facility]                    

 

                          Acetaminophen                         Notes: Do not ex

ceed 4 gm/day.  (Same as: 

Tylenol)                                                   No Longer Active     

 

                                                                      2017

  

                                        [facility]                 

   

 

                          NS-Oxytocin 30 units 500 ml 30 unit                   

      30 unit, 500 mL, 

Rate: 42 ml/hr, Infuse over: 11.9 hr, Dosing Weight 95.455, kg, Route: IV, Total
Volume: 500 mL, Start date: 17 0:20:00 CDT, Duration: 2 day, Stop date: 
17 0:19:00 CDT, Replace Every: 11.9 hr                                    

                    No Longer Active                                            

       

                          2017                         [facility]                    

 

                          Lactated Ringers 1,000 mL                         1,00

0 mL, Rate: 100 ml/hr, 

Infuse over: 10 hr, Route: IV, Dosing Weight 95.455 kg, Total Volume: 1,000, 
Start date: 17 0:20:00 CDT, Duration: 30 day, Stop date: 17 0:19:00 
CDT                                                   No Longer Active          

 

                                                                      2017

       

                                        [facility]                 

   

 

                                        Atropine Sulfate 0.025 MG / Diphenoxylat

e Hydrochloride 2.5 MG Oral Tablet 

[Lomotil]                               Notes: (Same As: Lomotil) MAX Adult dose

 = 8 

tabs/day                                                   No Longer Active     

 

                                                                      2017

 

                                        [facility]                 

   

 

                          morphine Sulfate (ANES)                         Route:

 INTRATHECAL, Drug form: 

INJ, ONCE, Stop date: 17 0:14:00 CDT                                      

                    Inactive                                                    

        

                          2017                         [facility]

nter       

             

 

                          fentaNYL (ANES)                         Route: INTRATH

ECAL, Drug form: INJ, 

ONCE, Stop date: 17 0:14:00 CDT                                           

                    Inactive                                                    

             

                          2017                         [facility]

nter            

        

 

                          metoclopramide (ANES)                         Route: I

V, Drug form: INJ, ONCE, 

Stop date: 17 0:14:00 CDT                                                 

                    Inactive                                                    

                   

                          2017                         [facility]

nter                  

  

 

                          famotidine (ANES)                         Route: IV, D

rug form: INJ, ONCE, Stop 

date: 17 0:14:00 CDT                                                   

Inactive                                                                        

 

                          2017                         [facility]

nter                   

 

 

                          sodium citrate (ANES)                         Route: P

O, Drug Form: INJ, ONCE, 

Stop date: 17 0:14:00 CDT                                                 

                    Inactive                                                    

                   

                          2017                         [facility]                  

  

 

                    ketOROLAC (ANES)                         IV, ONCE           

                    

                    Inactive                                                    

 

                          2017                         [facility]

nt

                    

 

                          ondansetron (ANES)                         Route: IV, 

Drug form: INJ, ONCE, Stop

 date: 17 0:14:00 CDT                                                   

Inactive                                                                        

 

                          2017                         [facility]

nter                   

 

 

                          acetaminophen (ANES)                         Route: IV

, Drug form: INJ, ONCE, 

Stop date: 17 0:14:00 CDT                                                 

                    Inactive                                                    

                   

                          2017                         [facility]                  

  

 

                          bupivacaine 0.75% (ANES)                         Route

: INTRATHECAL, Drug Form: 

INJ, ONCE, Stop date: 17 0:14:00 CDT                                      

                    Inactive                                                    

        

                          2017                         [facility]

nter       

             

 

                          fentaNYL (ANES)                         Route: IV, Brett

g form: INJ, ONCE, Stop 

date: 17 0:04:00 CDT                                                   

Inactive                                                                        

 

                          2017                         [facility]

nter                   

 

 

                          carboprost (ANES)                         Route: IM, D

rug form: INJ, ONCE, Stop 

date: 17 23:59:00 CDT                                                   

Inactive                                                                        

 

                          2017                         [facility]

nter                   

 

 

                          midazolam (ANES)                         Route: IV, Dr

ug form: SOLN, ONCE, Stop 

date: 17 23:59:00 CDT                                                   

Inactive                                                                        

 

                          2017                         [facility]

nter                   

 

 

                          sodium bicarbonate (ANES)                         Rout

e: EPIDURAL, Drug form: 

INJ, ONCE, Stop date: 17 23:44:00 CDT                                     

                    Inactive                                                    

       

                          2017                         [facility]

nt      

              

 

                          ketAMINE (ANES)                         Route: IV, Brett

g form: INJ, ONCE, Stop 

date: 17 23:44:00 CDT                                                   

Inactive                                                                        

 

                          2017                         [facility]

nter                   

 

 

                          lidocaine (ANES)                         Route: EPIDUR

AL, Drug form: INJ, ONCE, 

Stop date: 17 23:39:00 CDT                                                

                    Inactive                                                    

                  

                          2017                         [facility]

nter                 

   

 

                          ceFAZolin (ANES)                         Route: IV, Dr

ug form: INJ, ONCE, Stop 

date: 17 23:34:00 CDT                                                   

Inactive                                                                        

 

                          2017                         [facility]                   

 

 

                          Pitocin (ANES) (ANES) +                         Route:

 IV, Drug form: SOLN, 

Dosing Weight 95.5, kg, Start date: 17 23:20:00 CDT, Stop date: 17 
0:20:00 CDT                                                   No Longer Active  

 

                                                                        

2017                              [facility]                 

   

 

                    Cefazolin                         Notes: Same as: Ancef     

                    

                          Inactive                                              

 

                                             2017                         

[facility]                    

 

                          Azithromycin                         Notes: (Same As: 

Zithromax IV)             

                                             Inactive                           

        

                                             2017                         

[facility]                    

 

                          LR 1000 mL INJ (ANES)                         Route: I

V, Total Volume: 1,000, 

Start date: 17 22:44:00 CDT, Stop date: 17 23:44:00 CDT             
                                             Inactive                           

        

                                             2017                         

[facility]                    

 

                          Ondansetron                         Notes: (Same as: ASHLY maloney)   *** MEDICATION 

WASTE *** Product Size:  4 mg Product Wasted:  ___ mg                           

                          No Longer Active                                      

   

                                             2017                         

[facility]                    

 

                          Morphine                         Notes: (Same as:MORPh

ine Sulfate)              

                                             No Longer Active                   

         

                                                    2017                  

     

                                        [facility]                 

   

 

                                        Penicillin G Potassium [id number] UNT/ML In

jectable Solution                       

                                        Notes: (Same as: Pfizerpen)   *** MEDICA

TION WASTE *** Product Size:  

5,000,000 unit Product Wasted:  ___ unit                                        

                    Inactive                                                    

          

                          2017                         [facility]

nter         

           

 

                                        Calcium Chloride 0.002 MEQ/ML / Glucose 

50 MG/ML / Potassium Chloride 0.004 

MEQ/ML / Sodium Chloride 0.147 MEQ/ML Injectable Solution                       
                                        1,000 mL, Rate: 999 ml/hr, Infuse over: 

1 hr, Route: IV, Dosing Weight 95.455 

kg, Total Volume: 1,000, Start date: 17 17:04:00 CDT, Duration: 1 doses or
 times, Stop date: 17 18:03:00 CDT                                        

                    Inactive                                                    

          

                          2017                         [facility]

nt         

           

 

                          influenza virus vaccine, inactivated                  

       Notes: (Same as: 

Fluzone Quadrivalent, Fluarix Quadrivalent)  For 3 years of age and older (0.5 
mL IM) Shake well before use                                                   

Inactive                                                                        

 

                          2017                         [facility]

nter                   

 

 

                          Fioricet                         Notes: (acetaminophen

-butalbital-caffeine 

325-50-40mg)  Do not exceed 4 gm/day of acetaminophen.  (Same as: Esgic, 
Fioricet)                                                   Inactive            

 

                                                                      2017

        

                                        [facility]                 

   

 

                          Ofirmev                         Notes: Infuse over 15 

minutes  Do not exceed 

4gm/day of acetaminophen    *** MEDICATION WASTE *** Product Size:  1000 mg 
Product Wasted:  ___ mg                                                   

Inactive                                                                        

 

                          2017                         [facility]

nter                   

 

 

                          NS-Oxytocin 30 units 500 ml 30 unit                   

      30 unit, 500 mL, 

Rate: Titrate, Dosing Weight 95.455, kg, Route: IV, Total Volume: 500 mL, Start 
date: 17 18:41:00 CDT, Duration: 2 day, Stop date: 17 18:40:00 CDT, 
Replace Every: 24 hr                                                   No Longer

 

Active                                                                          

 

                          2017                         [facility]

nt                    

 

                          oxytocin 30 unit                         30 unit, 500 

mL, Rate: Titrate, Dosing 

Weight 95.455, kg, Route: IV, Total Volume: 500 mL, Start date: 17 
18:31:00 CDT, Duration: 2 day, Stop date: 17 18:30:00 CDT, Replace Every: 
24 hr                                                   Inactive                

 

                                                                      2017

            

                                        [facility]                 

   

 

                          Famotidine 20 MG Oral Tablet [Pepcid]                 

        Notes: (Same as: 

Pepcid)                                                   No Longer Active      

 

                                                                      2017

  

                                        [facility]                 

   

 

                          Misoprostol                         Notes: (Same as:Cy

totec)   Take with food   

                                                    No Longer Active            

     

                                                                      2017

            

                                        [facility]                 

   

 

                          Carboprost                         Notes: (Same As: He

mabate)                   

                                             No Longer Active                   

              

                                             2017                         

[facility]                    

 

                          Famotidine                         Notes: (Same as: Pe

pcid) Can be dilute in 5-

10cc NS  IVP: Slow IV push over at least 2 minutes.                             

                          No Longer Active                                      

     

                                             2017                         

[facility]                    

 

                          Citric Acid / sodium citrate                         N

otes: (Same As: Bicitra, 

Cytra-2) Sodium citrate-citric acid (500-334 mg/5 mL): 1 mL contains sodium 1 
mEq/mL and bicarbonate 1 mEq/mL                                                 

                    No Longer Active                                            

                   

                          2017                         [facility]

nter          

          

 

                          Methylergonovine                         Notes: (Same 

as:Methergine)            

                                             No Longer Active                   

       

                                                    2017                  

   

                                        [facility]                 

   

 

                          Misoprostol                         Notes: (Same as:Cy

totec)  Take with food  25

 microgram = 1/4 tab of 100 microgram.                                          

                    No Longer Active                                            

            

                          2017                         [facility]

nter   

                 

 

                          GI cocktail                         Notes: G.I. Cockta

il = antacid with 

simethicone 22.5 mL - lidocaine viscous 7.5 mL                                  

                          No Longer Active                                      

          

                                             2017                         

[facility]                    

 

                          Ondansetron                         Notes: (Same as: ASHLY maloney)   *** MEDICATION 

WASTE *** Product Size:  4 mg Product Wasted:  ___ mg                           

                          No Longer Active                                      

   

                                             2017                         

[facility]                    

 

                          Tramadol                         Notes: Not to exceed 

400mg/day. (Same As: 

Ultram)                                                   No Longer Active      

 

                                                                      2017

  

                                        [facility]                 

   

 

                          Ibuprofen                         Notes: (Same as: Mot

rin) "Do Not Crush"  Take 

with food.                                                   No Longer Active   

 

                                                                       

2017                              [facility]                 

   

 

                          Butorphanol                         Notes: (Same As: S

tadol)   *** MEDICATION 

WASTE *** Product Size:  2 mg Product Wasted:  ___ mg                           

                          No Longer Active                                      

   

                                             2017                         

[facility]                    

 

                          Lidocaine Hydrochloride 10 MG/ML Injectable Solution  

                       

Notes: (Same as: Xylocaine)                                                   No

 

Longer Active                                                                   

 

                          2017                         [facility]

nter              

      

 

                          Terbutaline                         Notes: **DO NOT US

E IN OB/GYN AREA**  (Same 

As: Brethine)                                                   No Longer Active

 

                                                                          

2017                              [facility]                 

   

 

                          Lactated Ringers 1,000 mL                         1,00

0 mL, Rate: 125 ml/hr, 

Infuse over: 8 hr, Route: IV, Dosing Weight 95.455 kg, Total Volume: 1,000, 
Start date: 17 13:03:00 CDT, Duration: 30 day, Stop date: 17 
13:02:00 CDT                                                   No Longer Active 

 

                                                                         

2017                              [facility]                 

   

 

                          NS-Oxytocin 30 units 500 ml 30 unit                   

      30 unit, 500 mL, 

Rate: 42 ml/hr, Infuse over: 11.9 hr, Dosing Weight 95.455, kg, Route: IV, Total
 Volume: 500 mL, Start date: 17 13:03:00 CDT, Duration: 2 day, Stop date: 
17 13:02:00 CDT, Replace Every: 11.9 hr                                   

                          No Longer Active                                      

           

                                             2017                         

[facility]                    

 

                                        Calcium Chloride 0.0014 MEQ/ML / Potassi

um Chloride 0.004 MEQ/ML / Sodium 

Chloride 0.103 MEQ/ML / Sodium Lactate 0.028 MEQ/ML Injectable Solution         
                                        1,000 mL, 1,000 ml/hr, Infuse Over: 1 hr

, Route: IV, 1,000, Drug

 form: INJ, ONCE, Dosing Weight 95.455 kg, Start date: 17 13:03:00 CDT, 
Stop date: 17 13:03:00 CDT, Bolus for regional anesthesia per unit 
protocol                                                   Inactive             

 

                                                                      2017

         

                                        [facility]                 

   

 

                          Fioricet                         Notes: (acetaminophen

-butalbital-caffeine 

325-50-40mg)  Do not exceed 4 gm/day of acetaminophen.  (Same as: Esgic, 
Fioricet)                                                   No Longer Active    

 

                                                                      2017

                                        [facility]                 

   

 

                                        Calcium Chloride 0.0014 MEQ/ML / Potassi

um Chloride 0.004 MEQ/ML / Sodium 

Chloride 0.103 MEQ/ML / Sodium Lactate 0.028 MEQ/ML Injectable Solution         
                                        1,000 mL, 1,000 ml/hr, Infuse Over: 1 hr

, Route: IV, ONCE, 

Priority: STAT, Dosing Weight 92.727 kg, Start date: 17 23:10:00 CST, 
Duration: 1 doses or times, Stop date: 17 23:10:00 CST                    
                                             Inactive                           

               

                                             2017                         

[facility]                    



                                                                                
                                                                                
                                                                                
                                                                                
                                                                                
                                                                                
                                                                                
                                                                                
                                                                                
                                                                                
                                                                                
                                                                                
                                                                                
                                                                                
                                                                                
                                                                                
                                                                    



Allergies, Adverse Reactions, Alerts

        



                                        No Known Medication Allergies           

           



                                                        



Immunizations

        



                                        No Data Provided for This Section



                                    



Results





                    Order Name                         Results                  

       Value        

                          Reference Range                         Date          

         

                    Interpretation                         Comments             

            

Source                    

 

                HEMATOLOGY                         Hgb             8.5          

               12.0 - 16.0  

                          2017                                            

   

                                                    [facility]     

               

 

                HEMATOLOGY                         Hct             26.5         

                36.0 - 48.0 

                          2017                                            

  

                                                    [facility]     

               

 

                BLOOD BANK RESULTS                         ABO/Rh          O POS

                         

                          2017                                            

 

                                                    [facility]     

               

 

                    BLOOD BANK RESULTS                         Antibody Scrn    

   Negative 

                    (17 7:50 AM)                                            

      2017    

                                                                       [facility]                    

 

                BLOOD BANK RESULTS                         ABO/Rh          O POS

                         

                          2017                                            

 

                                                    [facility]     

               

 

                    BLOOD BANK RESULTS                         Antibody Scrn    

   Negative 

                    (4/3/17 1:14 PM)                                            

      2017    

                                                                       [facility]                    

 

                    DRUG SCREEN                         UDS Note            See 

Note 

                    (4/3/17 1:14 PM)                                            

      2017    

                                                                       [facility]                    

 

                    DRUG SCREEN                         U Phencyc Scr       Nega

tive 

*NA*

                    (4/3/17 1:14 PM)                         Negative           

              

2017                                                                      

 

                                        [facility]                 

   

 

                    DRUG SCREEN                         U Opiate Scr        Nega

tive 

*NA*

                    (4/3/17 1:14 PM)                         Negative           

              

2017                                                                      

 

                                        [facility]                 

   

 

                    DRUG SCREEN                         U Cannab Scr        Nega

tive 

*NA*

                    (4/3/17 1:14 PM)                         Negative           

              

2017                                                                      

 

                                        [facility]                 

   

 

                    DRUG SCREEN                         U Cocaine Scr       Nega

tive 

*NA*

                    (4/3/17 1:14 PM)                         Negative           

              

2017                                                                      

 

                                        [facility]                 

   

 

                    DRUG SCREEN                         U Benzodia Scr      Nega

tive 

*NA*

                    (4/3/17 1:14 PM)                         Negative           

              

2017                                                                      

 

                                        [facility]                 

   

 

                    DRUG SCREEN                         U Amph Scr          Nega

tive 

*NA*

                    (4/3/17 1:14 PM)                         Negative           

              

2017                                                                      

 

                                        [facility]                 

   

 

                    DRUG SCREEN                         U Shahida Scr          Posi

tive 

*ABN*

                    (4/3/17 1:14 PM)                         Negative           

              

2017                                                                      

 

                                        [facility]                 

   

 

                    IMMUNOLOGY                         Hep Bs Ag           Negat

leela 

*NA*

                    (4/3/17 1:14 PM)                         Negative           

              

2017                                                                      

 

                                        [facility]                 

   

 

                    IMMUNOLOGY                         RPR                 Non R

eactive 

                    (4/3/17 12:22 PM)                         Non Reactive      

                   

2017                                                                      

 

                                        [facility]                 

   

 

                    IMMUNOLOGY                         [employer]      React

leela 

*ABN*

                    (4/3/17 12:22 PM)                         Non Reactive      

                   

2017                                                                      

 

                                        [facility]                 

   

 

                    IMMUNOLOGY                         HIV.                Negat

leela 

*NA*

                    (4/3/17 12:22 PM)                         Negative          

               

2017                                                                      

 

                                        [facility]                 

   

 

                    IMMUNOLOGY                         T pallidum Ab       React

leela 

*ABN*

                    (4/3/17 12:22 PM)                         Non Reactive      

                   

2017                                                                      

 

                                        [facility]                 

   

 

                CHEM PANEL                         LDH             200          

               98 - 192     

                    2017                                                  

                                        [facility]                 

   

 

                CHEM PANEL                         eGFR            153          

                           

                    2017                                                  

Result 

Comment: The eGFR is calculated using the CKD-EPI formula. In most young, 
healthy individuals the eGFR will be >90 mL/min/1.73m2. The eGFR declines with 
age. An eGFR of 60-89 may be normal in some populations, particularly the 
elderly, for whom the CKD-EPI formula has not been extensively validated. Use of
 the eGFR is not recommended in the following populations:<br/><br/>Individuals 
with unstable creatinine concentrations, including pregnant patients and those 
with serious co-morbid conditions.<br/><br/>Patients with extremes in muscle 
mass or diet. <br/><br/>The data above are obtained from the National Kid[employer] 
Disease Education Program (NKDEP) which additionally recommends that when the 
eGFR is used in patients with extremes of body mass index for purposes of drug 
dosing, the eGFR should be multiplied by the estimated BMI.                     
                                        [facility]                 

   

 

                CHEM PANEL                         Creatinine Lvl  0.50         

                

0.50 - 1.40                         2017                                  

                                                    [facility]     

           

    

 

                CHEM PANEL                         AST             15           

              0 - 37        

                    2017                                                  

   

                                        [facility]                 

   

 

                CHEM PANEL                         ALT             13           

              0 - 65        

                    2017                                                  

   

                                        [facility]                 

   

 

                CHEM PANEL                         Uric Acid       3.0          

               2.5 - 

7.0                         2017                                          

                                                    [facility]     

               

 

                HEMATOLOGY                         Monocytes #     0.5          

               0.0 -

 0.8                         2017                                         

                                                    [facility]     

               

 

                HEMATOLOGY                         Eosinophils #   0.1          

               0.0

 - 0.5                         2017                                       

                                                    [facility]     

               

 

                HEMATOLOGY                         Monocytes       6.9          

               2.0 - 

12.0                         2017                                         

                                                    [facility]     

               

 

                HEMATOLOGY                         Lymphocytes #   1.5          

               1.0

 - 5.5                         2017                                       

                                                    [facility]     

               

 

                HEMATOLOGY                         Segs-Bands #    5.1          

               1.5 

- 8.1                         2017                                        

                                                    [facility]     

               

 

                HEMATOLOGY                         Basophils       0.3          

               0.0 - 

1.0                         2017                                          

                                                    [facility]     

               

 

                HEMATOLOGY                         Eosinophils     1.0          

               0.0 -

 4.0                         2017                                         

                                                    [facility]     

               

 

                HEMATOLOGY                         Lymphocytes     21.1         

                20.0

 - 40.0                         2017                                      

                                                    [facility]     

               

 

                HEMATOLOGY                         Segs            70.7         

                45.0 - 75.0

                          2017                                            

 

                                                    [facility]     

               

 

                HEMATOLOGY                         MPV             9.6          

               7.4 - 10.4   

                          2017                                            

    

                                                    [facility]     

               

 

                HEMATOLOGY                         Platelet        150          

               133 - 

450                         2017                                          

                                                    [facility]     

               

 

                HEMATOLOGY                         MCH             26.2         

                27.0 - 31.0 

                          2017                                            

  

                                                    [facility]     

               

 

                HEMATOLOGY                         MCV             82.2         

                80.0 - 98.0 

                          2017                                            

  

                                                    [facility]     

               

 

                HEMATOLOGY                         RDW             15.9         

                11.5 - 14.5 

                          2017                                            

  

                                                    [facility]     

               

 

                HEMATOLOGY                         MCHC            31.9         

                32.0 - 36.0

                          2017                                            

 

                                                    [facility]     

               

 

                HEMATOLOGY                         Hct             34.0         

                36.0 - 48.0 

                          2017                                            

  

                                                    [facility]     

               

 

                HEMATOLOGY                         Hgb             10.8         

                12.0 - 16.0 

                          2017                                            

  

                                                    [facility]     

               

 

                HEMATOLOGY                         RBC             4.14         

                4.20 - 5.40 

                          2017                                            

  

                                                    [facility]     

               

 

                HEMATOLOGY                         WBC             7.2          

               3.7 - 10.4   

                          2017                                            

    

                                                    [facility]     

               

 

                URINE CHEM                         U Prot/Creat    0.2          

                   

                          2017                                            

     

                                                    [facility]     

               

 

                URINE CHEM                         U Creatinine    31.30        

                   

                          2017                                            

   

                                                    [facility]     

               

 

                URINE CHEM                         U Protein       5.4          

                      

                    2017                                                  

  

                                        [facility]                 

   

 

                CHEM PANEL                         ALT             15           

              0 - 65        

                    2017                                                  

   

                                        [facility]                 

   

 

                CHEM PANEL                         LDH             228          

               98 - 192     

                    2017                                                  

                                        [facility]                 

   

 

                CHEM PANEL                         Uric Acid       2.3          

               2.5 - 

7.0                         2017                                          

                                                    [facility]     

               

 

                CHEM PANEL                         Creatinine Lvl  0.47         

                

0.50 - 1.40                         2017                                  

                                                    [facility]     

           

    

 

                CHEM PANEL                         eGFR            156          

                           

                    2017                                                  

Result 

Comment: The eGFR is calculated using the CKD-EPI formula. In most young, 
healthy individuals the eGFR will be >90 mL/min/1.73m2. The eGFR declines with 
age. An eGFR of 60-89 may be normal in some populations, particularly the 
elderly, for whom the CKD-EPI formula has not been extensively validated. Use of
 the eGFR is not recommended in the following populations:<br/><br/>Individuals 
with unstable creatinine concentrations, including pregnant patients and those 
with serious co-morbid conditions.<br/><br/>Patients with extremes in muscle 
mass or diet. <br/><br/>The data above are obtained from the National Kid[employer] 
Disease Education Program (NKDEP) which additionally recommends that when the 
eGFR is used in patients with extremes of body mass index for purposes of drug 
dosing, the eGFR should be multiplied by the estimated BMI.                     
                                        [facility]                 

   

 

                CHEM PANEL                         AST             13           

              0 - 37        

                    2017                                                  

   

                                        [facility]                 

   

 

                HEMATOLOGY                         Eosinophils #   0.2          

               0.0

 - 0.5                         2017                                       

                                                    [facility]     

               

 

                HEMATOLOGY                         Basophils #     0.1          

               0.0 -

 0.2                         2017                                         

                                                    [facility]     

               

 

                HEMATOLOGY                         Lymphocytes #   3.0          

               1.0

 - 5.5                         2017                                       

                                                    [facility]     

               

 

                HEMATOLOGY                         Monocytes #     0.9          

               0.0 -

 0.8                         2017                                         

                                                    [facility]     

               

 

                HEMATOLOGY                         Eosinophils     2.0          

               0.0 -

 4.0                         2017                                         

                                                    [facility]     

               

 

                HEMATOLOGY                         Segs            65.0         

                45.0 - 75.0

                          2017                                            

 

                                                    [facility]     

               

 

                HEMATOLOGY                         Lymphocytes     24.3         

                20.0

 - 40.0                         2017                                      

                                                    [facility]     

               

 

                HEMATOLOGY                         Segs-Bands #    8.1          

               1.5 

- 8.1                         2017                                        

                                                    [facility]     

               

 

                HEMATOLOGY                         Basophils       1.2          

               0.0 - 

1.0                         2017                                          

                                                    [facility]     

               

 

                HEMATOLOGY                         Monocytes       7.5          

               2.0 - 

12.0                         2017                                         

                                                    [facility]     

               

 

                HEMATOLOGY                         MCHC            33.1         

                32.0 - 36.0

                          2017                                            

 

                                                    [facility]     

               

 

                HEMATOLOGY                         RDW             15.0         

                11.5 - 14.5 

                          2017                                            

  

                                                    [facility]     

               

 

                HEMATOLOGY                         MPV             9.9          

               7.4 - 10.4   

                          2017                                            

    

                                                    [facility]     

               

 

                HEMATOLOGY                         MCH             27.8         

                27.0 - 31.0 

                          2017                                            

  

                                                    [facility]     

               

 

                HEMATOLOGY                         RBC X 10x6      3.95         

                4.20 

- 5.40                         2017                                       

                                                    [facility]     

               

 

                HEMATOLOGY                         WBC X 10x3      12.4         

                3.7 -

 10.4                         2017                                        

                                                    [facility]     

               

 

                HEMATOLOGY                         Hgb             11.0         

                12.0 - 16.0 

                          2017                                            

  

                                                    [facility]     

               

 

                HEMATOLOGY                         Hct             33.1         

                36.0 - 48.0 

                          2017                                            

  

                                                    [facility]     

               

 

                HEMATOLOGY                         MCV             83.8         

                80.0 - 98.0 

                          2017                                            

  

                                                    [facility]     

               

 

                HEMATOLOGY                         Platelet        218          

               133 - 

450                         2017                                          

                                                    [facility]     

               



                                                                                
                                                                                
                                                                                
                                                                                
                                                                                
                                                                                
                                                                                
                                                                                
                             



Pathology Reports





                                        No Data Provided for This Section       

             



                            



Diagnostic Reports

            



                                        No Data Provided for This Section       

             



                                                            



Consultation Notes

                    



                                        No Data Provided for This Section       

             



                                                            



Discharge Summaries

                    



                                        No Data Provided for This Section       

             



                                                            



History and Physicals

                    



                                        No Data Provided for This Section       

             



                                                                



Vital Signs

                     



                    Vital Sign                         Value                    

     Date           

                          Comments                         Source               

     

 

                    Systolic (mm Hg)                         128                

          2017

                                                    [facility]     

  

             

 

                    Diastolic (mm Hg)                         77                

          2017

                                                    [facility]     

  

             

 

                    Respitory Rate                         18                   

       2017   

                                                    [facility]     

     

          

 

                    Temperature Oral (F)                         98.3 F         

                

2017                                                   [facility]                    

 

                    Heart Rate                         88                       

   2017       

                                                    [facility]     

         

      

 

                    Heart Rate                         89                       

   2017       

                                                    [facility]     

         

      

 

                    Respitory Rate                         18                   

       2017   

                                                    [facility]     

     

          

 

                    Systolic (mm Hg)                         138                

          2017

                                                    [facility]     

  

             

 

                    Diastolic (mm Hg)                         84                

          2017

                                                    [facility]     

  

             

 

                    Temperature Oral (F)                         98.7 F         

                

2017                                                   [facility]                    

 

                    Temperature Oral (F)                         98.2 F         

                

2017                                                   [facility]                    

 

                    Heart Rate                         72                       

   2017       

                                                    [facility]     

         

      

 

                    Respitory Rate                         18                   

       2017   

                                                    [facility]     

     

          

 

                    Systolic (mm Hg)                         125                

          2017

                                                    [facility]     

  

             

 

                    Diastolic (mm Hg)                         65                

          2017

                                                    [facility]     

  

             

 

                    Weight                         95.455                       

   2017       

                                                    [facility]     

         

      

 

                    Height                         157.48 cm                    

     2017     

                                                    [facility]     

       

        

 

                    BMI Calculated                         38.49                

          2017

                                                    [facility]     

  

             

 

                    Weight                         95.455                       

   2017       

                                                    [facility]     

         

      

 

                    BMI Calculated                         38.49                

          2017

                                                    [facility]     

  

             

 

                    Height                         157.48 cm                    

     2017     

                                                    [facility]     

       

        

 

                    Systolic (mm Hg)                         106                

          2017

                                                    [facility]     

  

             

 

                    Diastolic (mm Hg)                         63                

          2017

                                                    [facility]     

  

             

 

                    Systolic (mm Hg)                         112                

          2017

                                                    [facility]     

  

             

 

                    Diastolic (mm Hg)                         71                

          2017

                                                    [facility]     

  

             

 

                    Systolic (mm Hg)                         122                

          2017

                                                    [facility]     

  

             

 

                    Diastolic (mm Hg)                         69                

          2017

                                                    [facility]     

  

             

 

                    BMI Calculated                         37.39                

          2017

                                                    [facility]     

  

             

 

                    Weight                         92.727                       

   2017       

                                                    [facility]     

         

      

 

                    Height                         157.48 cm                    

     2017     

                                                    [facility]     

       

        

 

                    Temperature Oral (F)                         98.4 F         

                

2017                                                   [facility]                    

 

                    Respitory Rate                         20                   

       2017   

                                                    [facility]     

     

          

 

                    Heart Rate                         122                      

    2017      

                                                    [facility]     

        

       



                                                                                
                                                                                
                                                                                
                                                                                
                                                                                
                                                                                
                                                                                
                                        



Encounters

                    



                    Location                         Location Details           

              

Encounter Type                         Encounter Number                         

Reason For Visit                         Attending Provider                     

                    ADM Date                         DC Date                    

     Status     

                                        Source                    

 

                    [facility]                                   

               

Emergency                         [id number]                                  

                          Noemí Burnett                          2017                                                  

[facility]                                   

               

Inpatient                         [id number]                                  

                          Elvin Sanders                          2017                                                  

[facility]                    



                                                                                
    



Procedures

        



                                        No Data Provided for This Section



                                                        



Assessment and Plan

                    



                    Assessment and Plan                         Date            

             Source 

                   

 

                                        Extracted from:Title: Clinical Document

Author: Myra Daniels MD

Date: 17



R1 OB/GYN Pospartum note



S: Pt feeling well. Pain is better controlled with current medications. More on 
ambulating 4/10. Pt tolerating regular diet. Voiding freely. Ambulating.

O: Vitals    Tmp(F)    Tmp(C)    Ttype    BP    MAP    Pulse    RR    SpO2    
FIO2    ETCO2

                                        04 04:28    98.7    37.06    oral    

138/84    ---    89    18    ---    --- 

   ---

                                         00:28    98.2    36.78    oral    

125/65    ---    72    18    ---    --- 

   ---

                                         20:00    98.0    36.67    axil    

133/89    ---    100    18    99    --- 

   ---

                                         16:03    98.0    36.67    oral    

124/84    ---    91    18    ---    --- 

   ---

                                         12:28    98.3    36.83    oral    

120/76    ---    90    18    ---    --- 

   ---

                                        24 Hr Tmax: 98.7F (37.06c) at  04:2

8        24 Hr Tmin:  98.0F (36.67c) at 

 20:00

                                        36 Hr Tmax: 98.7F (37.06c) at  04:2

8        36 Hr Tmin:  97.9F (36.61c) at 

 00:30

Vital Signs are the last 5 in the past 48 hours.    Weights are the last 5 in 60
 days, plus initial.

Date    Wt(kg)    Wt(lb)    Ht(cm)    Ht(in)    Method    BMI    BSA

                                        04/ (initial)     95.45     210.00    

    Measured     38.5    2.04

                                        04/03    157.48     62.00    Stated



Gen: NAD

CV: RRR

Resp: CTAB

GI: soft/ fundus firm at umbilicus/ nontender/ nondistended/ normoactive BS

Incision: C/D/I

: lochia scant,

Ext: no c/c/e. SCDs/ TEDs in place



A/P: 26 y.o , s/p pLTCS 2/2 FIOL

                                        1)     POD#3: AFVSS

                                        2)     Pain: Controlled with current med

ications. Dc home with Ibuprofen, 

Tramadol and Gabapentin.

                                        3)     Heme: 10.8 > 2000 cc EBL + hemaba

te x 1 > 8.5. No s/sx of anemia 

currently.

                                        4)     GI/: [place] regular diet. Voding f

trevonly.

                                        5)     Rh+/ rubella immune/ HIV neg / He

pBSag neg / RPR NR / BCM: Depo, ordered 

/ Breastfeeding

                                        6)     Seizure disorder: Last seizure at

 age 17. No symptoms currently.

                                        7)     H/o syphilis: RPR NR on this admi

ssion.

                                        8)     Obesity: BMI 39. Ambulating.

                                        9)     GHTN: Dx by elevated BPs > 4 hrs 

apart. UPCr 0.2, preE panel negative. No

 s/sx of PreE. Currently normotensive.

                                        10)   DC home with one week F/U for BP c

heck.

Myra Suarez MD

OB/GYN, PGY1

Addendum by Noemí Burnett MD on 2017 12:18

Attending Physician Attestation:  I agree with stated by resident today.

No complaints, Normal lochia, pain controlled

VSS, Afebrile, good uop

Exam benign

Abd soft, FF @ umb, Inc c/d/i

Ext: No calf tenderness

A/P:  25yo POD #  3  s/pc/s

                                        1. Postop-  routine advances, continue p

ost-op care.

                                        2.  PNL WNL

                                        3.  Post op H/H-acute on chronic blood l

oss anemia- asymptomatic, Fe on d/c

                                        4.  See resident note for details.

Anticipate d/c home today or tomorrow.

Addendum by Myra Daniels MD on 2017 19:28



Discharge summary confirmation #[id number]



Extracted from:Title: Clinical Document

Author: Elvin Sanders MD

Date: 4/3/17

OB Evaluation > 20 Weeks *ED

Patient:   JERMAINE MARLEY            MRN: [id number]            FIN: 
[id number]

Age:   26 years     Sex:  Female     :  1990

Associated Diagnoses:   None

Author:   Veronika Matamoros MD

History of Present Illness

[facility]etric[facility]ne[facility]

[facility]

Department of Obstetrics

History and Physical

LMP: 2016

EDC: 2017

EGA: 38w1d

CC: elevated BP at pharmacy

HPI: 25yo  at 38w1d by LMP c/w doc 18wk stu presents for evaluation of 
elevated BPs at pharmacy today 174/103.  Denies HA, scotomata, RUQ/Epigastric 
pain.  Endorses swelling of LEs.

Patient reports + FM.

Patient denies  painful contractions, LOF, VB, discharge, pruritus, dysuria.

PNC: Dr. Shook since 20 wks x 3 visits, YELITZA to [facility] at 27wk, last appointment
 on 2017, next appointment on 2017.

* Dated as above.

* Hospitalizations: none

* Complications: none

* PNL: O+/Ab (-), RI, HBsAG neg, RPRNR, 1THIV neg, GC/CT neg, 1h  , H/H 
11.2/34.1, plt 162, GBS n/a, pap negative, quad neg

* US:

                                        --MFM sono, 25wks, posterior fundal plac

enta, no previa, female, EFW 23rd %tile,

 VENANCIO 17.8cm, nl anatomy 

* Female infant

* Bottlefeeding / desires epidural / accepts blood transfusion / BCM: Depo shot

* Obesity affecting pregnancy , with BMI of 38

* Comorbidities:

                                        1. H/o preE w/ SF: In both prior pregnan

cies. Was induced at 35wks and 37wks due

 to preE w/ SF. Never on aspirin.

                                        - Baseline labs ALT/AST 13/9, UPCR 0.2, 

uric acid 2.4, , Cr 0.5.

                                        2. H/o PTB: At 35wks in , indicated 

IOL for preE. No 17-OHP indicated.

                                        3. Seizure disorder: Dx in childhood fir

st seizure at age 14, last seizure at 

age 17. Was on Topomax, which patient self discontinued at age 17y due to weight
 loss.

                                        - Not followed by Neurology.

                                        4. H/o syphilis: Dx in . Pt was santo

ginny with 3 doses of IM PCN, states she 

was re-treated in . Current partner tested negative per pt.

                                        5. 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

o need for suppresive therapy.

                                        6. Pruritus: Pt c/o itching of her legs,

 especially when she wears tights or 

pants. Resolved now. Denies itching anywhere else on her body. Bile acids 1.2 
and LFTs ALT/AST 

OB History: 

                                        : 35wks, IOL 2/2 preE w/ SF. . Fe

male, no complications

                                        2013: ETOP, D&C

                                        2014: 37wks, IOL 2/2 preE w/ SF. . Fe

male, no complications

GYN History: 11yo/q1-2month/5-7d, h/o syphillis in  sp tx; Denies h/o 
abnormal papsmears, Denies h/o STDs including chlamydia, gonorrhea, herpes, 
trichomonas

PMH: as above; denies h/o asthma, diabetes, hypertension, thyroid disease, 
cancer

PSH: D&C x1

Meds: none

Allergies: NKDA

Family History: some cancer, unsure type

Social History:  denies tobacco, alcohol, drugs. Stopped tobacco when she 
discovered she was pregnant.  Lives with children and FOB, denies domestic 
abuse, feels safe at home.

Physical Exam

Vitals    Tmp(F)    Pulse    BP    RR    SpO2    FIO2

                                         11:45    ----    101    143/93    

--    ---    ---

                                         11:30    ----    ---    -----    -

-    100    ---

                                         11:15    ----    92    136/89    -

-    98    ---

                                         11:00    ----    97    138/91    -

-    ---    ---

                                         10:47    97.6    107    135/100   

 20    96    ---

                                        24 Hr Tmax: 97.6F (36.44c) at  10:4

7        Vital Signs are the last 5 in 

the past 48 hours.

Gen:NAD, A&O x3

Neck: no LAD, thyroid wnl

CV: RRR, 2+ pulses b/l upper and lower extremities

Pulm:CTAB

Abd:soft, NTTP, + BS, gravid

Ext: calves non-ttp, no c/c/e

SVE: //hi , at 1200 hour, Baum score: 0/13

SSE: NEFG, no lesions on vulva, vagina, or cervix.  Thick white physiologic 
discharge, no bleeding, no pooling with valsalva.

FHT: baseline 150s , + accelerations, - decelerations,  moderate variability, 
reactive strip

[place]: quiet

US: SIUP, cephalic, posterior/rt lateral placenta, no previa, DVP 4.4cm,  EFW 
2866g by Dr. Matamoros

Labs:

plt 150/ ALT/AST 13/15

UPCR pending

                                        3THIV pending

                                        3T RPR pending

GBS pending

Assessment and Plan: 25yo  at 38w1d by LMP c/w doc 18wk sono presents 
with mild range BPs

                                        1. Suspected Gestational HTN, r/o pre-ec

lampsia --> admit for IOL

                                        -- Baseline labs: ALT/AST 13/9, UPCR 0.2

, uric acid 2.4, , Cr 0.5, plt 

162.

                                        -- Labs today: pending, plt 150

                                        -- h/o preE in prior pregnancies, never 

on aspirin

                                        2. SIUP: Category I Strip, female infant

                                        -- US: cephalic, post/rt/lat placenta, 2

866g by sono

                                        -- Patient reports no complications duri

ng pregnancy or with sono.

                                        3. PNL: RH+, RI, HBsAg neg, RPRNR, 1THIV

 neg, GBS pending, unk, no RF / 3THIV 

pending

                                        4. Bottlefeeding / desires epidural / ac

cepts blood transfusion / BCM: Depo shot

                                        5. H/o PTB: At 35wks in , indicated 

IOL for preE. No 17-OHP indicated.

                                        6. Seizure disorder: Dx in childhood fir

st seizure at age 14, last seizure at 

age 17. Was on Topomax, which patient self discontinued at age 17y due to weight
 loss.

                                        - Not followed by Neurology.

                                        7. H/o syphilis: Dx in . Pt was santo

ginny with 3 doses of IM PCN, states she 

was re-treated in . Current partner tested negative per pt.

                                        8. 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

o need for suppresive therapy. NO 

lesions on speculum exam. Patient denies prodromal symptoms

Dispo: Admit for IOL given gestational HTN, r/o PreE.

Patient discussed with Dr. Benitez, R4.

Veronika Matamroos M.D. | #[id number]

Resident Physician | PGY2

Department of Obstetrics, Gynecology and Reproductive Sciences

[facility]

  
________________________________________________________________________________

_________________

R4 Addendum

                                        25yo  at 38w1d dated by LMP c/w d

oc 18wk sono with GHTN, seizure 

disorder, h/o PTB, h/o syphillis, HSV2+, scant PNC admitted for IOL 2/2 GHTN.

                                        1. IOL 2/2 GHTN: Admit to L&D. Prepare f

or . Sent PNL. Will place Cook 

balloon with cytotec.

                                        2. FHTs Cat 1

                                        3. 3T HIV pending, GBS unk- no risk fact

ors, so no PCN is indicated

                                        4. cepahlic, 3000g

                                        5. WE

                                        6. GHTN: Denies PreE sx. BPs mild range.

 PreE panel neg. UPCR 0.2. Will continue

 to monitor BPs.

                                        7. Seizure disorder: Stable. No meds.

                                        8. H/o indicated PTB at 35wks 2/2 PreE.

                                        9. H/o syphillis: s/p tmt. RPR NR. Admit

 trep screen pending.

                                        10. HSV2+: No suppression. Denies prodro

mal sx. No lesions on spec exam.

                                        11. Scant PNC: sent UDS. Will need SW pp

.

                                        12. Pt was discussed with Dr. Montalvo.

Isabela Benitez, PGY4



Attending Note

Pt seen and discussed with Ob team, agree with plan.

Pt with elevated blood pressure at term, gestational hypertension, admit for IOL

Elvin Sanders MD

                          2017                         [facility]                    



                                             



Plan of Care





                                        No Data Provided for This Section       

             



                                                                



Social History

                    



                    Social History                         Date                 

        Source      

              

 

                                        Social History TypeResponse

Alcohol

Past, Type Wine, Liquor.

Smoking Status

Former smoker; Number of years: 10; Started at age: 16.0; Stopped at age: 1; 
Exposure to Tobacco Smoke None; Cigarette Smoking Last 365 Days Yes; Reg Smoking
 Cessation Counseling Yes

                          2017                         [facility]                    



                                                                    



Family History

                    



                                        No Data Provided for This Section       

             



                                                            



Advance Directives

                    



                                        No Data Provided for This Section       

             



                                                            



Functional Status

                    



                                        No Data Provided for This Section

## 2020-08-18 NOTE — EMERGENCY DEPARTMENT NOTE
History of Present Illnes


History of Present Illness


Chief Complaint:  Extremity Trauma/Pain


History of Present Illness


This is a 30 year old female, with a history of hypertension and seizure 

disorder, who presents for evaluation of left ankle and left knee pain. Patient 

states that she was eating at a restaurant 2 days ago, when she was unaware that

the carpet was wet, and she inadvertently slipped and fell, injuring her left 

ankle and left knee. Patient landed on her buttocks, and she does not recall 

exactly how she injured her left lower extremity. She has not placed any ice on 

the injuries, but she has been taking ibuprofen 400 mg every 6 hours, or so. 

She's having pain with weightbearing, specifically the medial aspect of the left

ankle and medial aspect of the left knee. She denies any previous injury to the 

left lower extremity.


Historian:  Patient


Arrival Mode:  Car


 Required:  No


Onset (how long ago):  day(s) (2)


Location:  left knee and left ankle


Quality:  aching, throbbing


Radiation:  Reports non-radiation


Severity:  moderate


Onset quality:  sudden


Duration (how long):  day(s) (2)


Timing of current episode:  constant


Progression:  unchanged


Chronicity:  new


Context:  Denies recent illness, Denies trauma/injury, Denies non-compliance w/ 

medications


Relieving factors:  rest (non-weight bearing)


Exacerbating factors:  other (weightbearing)


Associated symptoms:  Reports denies other symptoms


Treatments prior to arrival:  none





Past Medical/Family History


Physician Review


I have reviewed the patient's past medical and family history.  Any updates have

been documented here.





Past Medical History


Recent Fever:  No


Clinical Suspicion of Infectio:  No


New/Unexplained Change in Ment:  No


Past Medical History:  Hypertension, Seizure Disorder


Past Surgical History:  None





Social History


Smoking Cessation:  Current every day smoker


Counseling Performed:  Yes


Alcohol Use:  None


Any Illegal Drug Use:  No


TB Exposure/Symptoms:  No


Physically hurt or threatened:  No





Family History


Family history of heart diseas:  No





Other


Any Pre-Existing Lines (PICC,:  No


Is patient up to date on immun:  No





Review of Systems


Review of Systems


Constitutional:  Denies chills, Denies fever


EENTM:  Reports no symptoms


Cardiovascular:  Denies chest pain, Denies palpitations


Respiratory:  Reports no symptoms


Gastrointestinal:  Reports no symptoms


Musculoskeletal:  Reports joint pain (left knee and left ankle), Reports joint 

swelling (mild left ankle); 


   Denies back pain


Integumentary:  Reports no symptoms


Neurological:  Denies numbness, Denies tingling


Psychological:  Reports no symptoms


Endocrine:  Reports no symptoms


Hematological/Lymphatic:  Reports no symptoms


Review of other systems:  All other systems negative





Physical Exam


Related Data


Allergies:  


Coded Allergies:  


     No Known Allergies (Unverified , 20)


Vital signs reviewed:  Yes





Physical Exam


CONSTITUTIONAL





Constitutional:  Present well-developed, Present well-nourished


HENT


HENT:  Present normocephalic, Present atraumatic, Present oropharynx 

clear/moist, Present nose normal


HENT L/R:  Present left ext ear normal, Present right ext ear normal


EYES





Eyes:  Reports PERRL, Reports conjunctivae normal


NECK


Neck:  Present ROM normal


PULMONARY


Pulmonary:  Present effort normal, Present breath sounds normal


CARDIOVASCULAR





Cardiovascular:  Present regular rhythm, Present heart sounds normal, Present 

capillary refill normal, Present normal rate


GASTROINTESTINAL





GENITOURINARY





Genitourinary:  Present exam deferred


SKIN


Skin:  Present warm, Present dry


MUSCULOSKELETAL





Musculoskeletal:  Present edema (mild edema of medial aspect of left foot; ), 

Present tenderness (ttp, with even light touch, of medial aspect of left knee 

and left ankle; unable to fully manipulate, due to discomfort, no crepitus. )


NEUROLOGICAL





Neurological:  Present alert, Present oriented x 3, Present no gross motor or 

sensory deficits


PSYCHOLOGICAL


Psychological:  Present mood/affect normal, Present judgement normal





Results


Imaging


Imaging results reviewed:  Yes


Impressions


                                        


                        [place] [id number]








Patient Name: JERMAINE MARLEY                          MR #: [id number]  


           


: 1990                         Age/Sex: 30/F


Acct #: [id number]                    Req #: [id number]


Adm Physician:                                      


Ordered by: DOMINGUEZ GÓMEZ MD                    Report #: [id number] 


[facility]                          Room/Bed:           


                                                                                


________________________________________________________________________________


___________________





Procedure:  HOPD/KNEE 3VW LT - HOPD


Exam Date: 20                            Exam Time: 1238








                              REPORT STATUS: Signed





Radiographs of the left knee - 3 views





HISTORY:  Pain


COMPARISON: None available.


     


FINDINGS:


Bones:


No acute displaced fracture.  


Osseous alignment is within normal limits.





Joints:


Mild tricompartmental degenerative arthrosis. No osseous erosion. 





Soft tissues:


Mild soft tissue swelling








IMPRESSION: 


Mild tricompartmental degenerative arthrosis. No osseous erosion. 





Mild soft tissue swelling.





Signed by: Dr. Ruddy Monaco M.D. on 2020 12:57 PM








Dictated By: RUDDY MONACO MD, MD


Electronically Signed By: RUDDY MONACO MD, MD on 20 1257


Transcribed By: CHRIS on 20 1257 








COPY TO:   DOMINGUEZ GÓMEZ MD~





                        [place] [address]








Patient Name: JERMAINE MARLEY                          MR #: [id number]  


           


: 1990                         Age/Sex: 30/F


Acct #: [id number]                    Req #: [id number]


Adm Physician:                                      


Ordered by: DOMINGUEZ GÓMEZ MD                    Report #: [id number] 


[facility]                          Room/Bed:           


                                     ___________________________________________


________________________________________________________





Procedure:  HOPD/ANKLE 3VIEW LT - HOPD


Exam Date: 20                            Exam Time: 1239








                              REPORT STATUS: Signed





Radiographs of the left ankle - 3 views





HISTORY:  Pain


COMPARISON: None available.


     


FINDINGS:


Bones:


No acute displaced fracture.   Small accessory navicular bone.


Osseous alignment is within normal limits.





Joints:


Scattered degenerative change. No osseous erosion. 





Soft tissues:


Mild soft tissue swelling








IMPRESSION: 


Scattered degenerative change. No osseous erosion. 


Mild soft tissue swelling.





Signed by: Dr. Ruddy Monaco M.D. on 2020 12:56 PM








Dictated By: RUDDY MONACO MD, MD


Electronically Signed By: RUDDY MONACO MD, MD on 20


Transcribed By: CHRIS on 20 








COPY TO:   DOMINGUEZ GÓMEZ MD~





Assessment & Plan


Medical Decision Making


MDM


 - Keep the left lower extremity elevated to the level of the heart, as much as 

possible, to help with pain and swelling.





 - Apply ice to the area pain, 15-20 minutes frequently throughout the day.





 - In addition to the pain medication prescribed, recommend he take ibuprofen 

200 mg3 tablets together every 6 hours, with food, for pain and inflammation.





 - Follow-up with her primary care physician, if your pain persists, for further

evaluation of this injury.





Assessment & Plan


Final Impression:  


(1) Left ankle sprain


(2) Left knee sprain


(3) Fall


Depart Disposition:  HOME, SELF-CARE


Home Meds


Active Scripts


Acetaminophen With Codeine (TYLENOL WITH CODEINE #3 TABLET) 1 Each Tablet, 1-2 

TAB PO Q6H for pain, #20 TAB 0 Refills


   Prov:DOMINGUEZ GÓMEZ MD         20











DOMINGUEZ GÓMEZ MD              Aug 18, 2020 12:04

## 2020-08-18 NOTE — XMS REPORT
Summary of Care: 4/3/17 - 17

                             Created on: 2051



DENA SHELLIE##

External Reference #: [id number]

: 1990

Sex: Female



Demographics





                          Address                   [address] 

[place]  [address]-

 

                          Home Phone                [phone]

 

                          Preferred Language        English

 

                          Marital Status            Single

 

                          Hindu Affiliation     None

 

                          Race                      

 

                          Ethnic Group              Non-





Author





                          Author                    [facility]

 

                          Organization              [facility]

 

                          Address                   Unknown

 

                          Phone                     Unavailable







Encounter





DIEGO Hemphill(NICOLA) [id number] Date(s): 4/3/17 - 17

[facility] [address] [facility] provided by         
  [employer]       [place] [address]-    
[phone]

Discharge Diagnosis: Gestational HTN

Discharge Disposition: Home or Self Care

Attending Physician: Elvin Sandesr MD

Admitting Physician: Elvin Sanders MD





Vital Signs





                    1                   2                   3



                                         Most recent to   



                                         oldest [Reference   



                                         Range]:   

 

                                        157.48 cm 

                          (4/3/17 9:14 PM)          157.48 cm 

                          (4/3/17 10:47 AM)          



                                         Height   

 

                                        98.3 DegF 

                          (17 8:34 AM)          98.7 DegF 

                          (17 4:28 AM)          98.2 DegF 

                                        (17 12:28 AM)



                                         Temperature Oral   



                                         [96.4-99.1 DegF]   

 

                                        128/77 mmHg 

                          (17 8:34 AM)          138/84 mmHg 

                          (17 4:28 AM)          125/65 mmHg 

                                        (17 12:28 AM)



                                         Blood Pressure   



                                         [/60-90 mmHg]   

 

                                        18 BRMIN 

                          (17 8:34 AM)          18 BRMIN 

                          (17 4:28 AM)          18 BRMIN 

                                        (17 12:28 AM)



                                         Respiratory Rate   



                                         [14-20 BRMIN]   

 

                                        88 bpm 

                          (17 8:34 AM)          89 bpm 

                          (17 4:28 AM)          72 bpm 

                                        (17 12:28 AM)



                                         Peripheral Pulse   



                                         Rate [ bpm]   

 

                                        95.455 kg 

                          (4/3/17 9:14 PM)          95.455 kg 

                          (4/3/17 10:47 AM)          



                                         Weight   

 

                                        38.49 m2 

                          (4/3/17 9:14 PM)          38.49 m2 

                          (4/3/17 10:47 AM)          



                                         Body Mass Index   







Problem List





    



              Condition    Effective Dates  Status       Health Status  Informan

t

 

    



                           Pre-eclampsia added       Active  



                                         to pre-existing    



                                         hypertension(Confirm    



                                         ed)    

 

    



                           (Confirmed)       Resolved  

 

    



                     Pregnant(Confirmed)  08 -       Resolved  

 

    



                     Pregnant(Confirmed)  11/6/10 -       Resolved  

 

    



                     Pregnant(Confirmed)  09 -       Resolved  

 

    



                     Pregnant(Confirmed)  11 -       Resolved  

 

    



                     Pregnant(Confirmed)  2/15/14 - 10/25/14  Resolved  

 

    



                     Pregnant(Confirmed)  07 - 08     Resolved  

 

    



                     Pregnant(Confirmed)  16 - 17     Resolved  

 

    



                           Blood pressure            Active  



                                         elevated(Confirmed)    

 

    



                     Seizure(Confirmed)   -          Resolved  







Allergies, Adverse Reactions, Alerts





   



                 Substance       Reaction        Severity        Status

 

   



                           NKDA                      Active







Medications





acetaminophen

650 mg, 2 tab, Route: PO, Drug form: TAB, Q4H, Dosing Weight 95.455, kg, PRN Oth
er -See Comment, Start date: 17 0:20:00 CDT, Duration: 30 day, Stop date: 
17 0:19:00 CDT

Notes: Do not exceed 4 gm/day.  (Same as: Tylenol)

Start Date: 17

Stop Date: 17

Status: Discontinued



acetaminophen (ANES)

Route: IV, Drug form: INJ, ONCE, Stop date: 17 0:14:00 CDT

Start Date: 17

Stop Date: 17

Status: Completed



acetaminophen-hydrocodone 325 mg-10 mg oral tablet

1 tab, Route: PO, Drug Form: TAB, Dosing Weight 95.455, kg, Q4H, PRN Pain Score 
7-10, Start date: 17 0:20:00 CDT, Duration: 30 day, Stop date: 17 0:
19:00 CDT

Notes: Do not exceed 4gm/day of acetaminophen.  (Same as: Norco 325/10)

Start Date: 17

Stop Date: 17

Status: Discontinued



acetaminophen-hydrocodone 325 mg-5 mg oral tablet

1 tab, Route: PO, Drug Form: TAB, Dosing Weight 95.455, kg, Q4H, PRN Pain Score 
4-6, Start date: 17 0:20:00 CDT, Duration: 30 day, Stop date: 17 0:1
9:00 CDT

Notes: (Same as: Norco 325/5)  Do not exceed 4gm/day of acetaminophen.

Start Date: 17

Stop Date: 17

Status: Discontinued



azithromycin

500 mg, Route: IVPB, Drug form: PDR/INJ, ONCALL, Dosing Weight 95.455, kg, Start
date: 17 23:00:00 CDT, Duration: 1 doses or times

Notes: (Same As: Zithromax IV)

Start Date: 17

Stop Date: 17

Status: Completed



benzocaine-menthol topical

1 lozenge, Route: PO, Drug Form: KIA, Dosing Weight 95.455, kg, PRN, PRN Sore Th
roat, Start date: 17 0:20:00 CDT, Duration: 30 day, Stop date: 17 0:
19:00 CDT

Notes: Cepacol lozengesDispense 1 box = 16 lozenges  (Same As: Cepacol Lozenges)

Start Date: 17

Stop Date: 17

Status: Discontinued



bisacodyl

10 mg, 1 supp, Route: ID, Drug form: SUPP, PRN, Dosing Weight 95.455, kg, PRN Ot
her -See Comment, Start date: 17 0:20:00 CDT, Duration: 30 day, Stop date:
17 0:19:00 CDT

Notes: (Same As: Dulcolax, Bisco-Lax)

Start Date: 17

Stop Date: 17

Status: Discontinued



bupivacaine 0.75% (ANES)

Route: INTRATHECAL, Drug Form: INJ, ONCE, Stop date: 17 0:14:00 CDT

Start Date: 17

Stop Date: 17

Status: Completed



butorphanol

2 mg, 1 mL, Route: IVP, Drug form: INJ, Q2H, Dosing Weight 95.455, kg, PRN Pain 
Score 7-10, Start date: 17 13:03:00 CDT, Duration: 30 day, Stop date: 05/0
3/17 13:02:00 CDT

Notes: (Same As: Stadol)  *** MEDICATION WASTE ***Product Size:  2 mgProduct Was
ginny:  ___ mg

Start Date: 4/3/17

Stop Date: 17

Status: Discontinued



butorphanol

1 mg, 0.5 mL, Route: IVP, Drug form: INJ, Q2H, Dosing Weight 95.455, kg, PRN Emily
n Score 4-6, Start date: 17 13:03:00 CDT, Duration: 30 day, Stop date:  13:02:00 CDT

Notes: (Same As: Stadol)  *** MEDICATION WASTE ***Product Size:  2 mgProduct Was
ginny:  ___ mg

Start Date: 4/3/17

Stop Date: 17

Status: Discontinued



carboprost

250 microgram, 1 mL, Route: IM, Drug form: INJ, ONCALL, Dosing Weight 95.455, kg
, Start date: 17 14:00:00 CDT, Duration: 30 day, Stop date: 17 13:59
:00 CDT

Notes: (Same As: Hemabate)

Start Date: 4/3/17

Stop Date: 17

Status: Completed



carboprost (ANES)

Route: IM, Drug form: INJ, ONCE, Stop date: 17 23:59:00 CDT

Start Date: 17

Stop Date: 17

Status: Completed



ceFAZolin

2 gm, 100 mL, Route: IVPB, Drug form: INJ, ONCALL, Dosing Weight 95.455, kg, Sta
rt date: 17 23:00:00 CDT, Duration: 1 doses or times

Notes: Same as: Ancef

Start Date: 17

Stop Date: 17

Status: Completed



ceFAZolin (ANES)

Route: IV, Drug form: INJ, ONCE, Stop date: 17 23:34:00 CDT

Start Date: 17

Stop Date: 17

Status: Completed



citric acid-sodium citrate

30 mL, Route: PO, Drug Form: SOLN, Dosing Weight 95.455, kg, ONCALL, Start date:
17 14:00:00 CDT, Duration: 30 day, Stop date: 17 13:59:00 CDT

Notes: (Same As: Anthony Castra-2) Sodium citrate-citric acid (500-334 mg/5 mL):
1 mL contains sodium 1 mEq/mL and bicarbonate 1 mEq/mL

Start Date: 4/3/17

Stop Date: 17

Status: Discontinued



D5W in Lactated Ringers 1,000 mL

1,000 mL, Rate: 999 ml/hr, Infuse over: 1 hr, Route: IV, Dosing Weight 95.455 kg
, Total Volume: 1,000, Start date: 17 17:04:00 CDT, Duration: 1 doses or t
imes, Stop date: 17 18:03:00 CDT

Start Date: 17

Stop Date: 17

Status: Completed



Depo-Provera

150 mg, 1 mL, Route: IM, Drug form: INJ, ONCE, Dosing Weight 95.455, kg, Start d
ate: 17 8:00:00 CDT, Stop date: 17 8:00:00 CDT

Notes: (Same as: Depo-Provera)This is NOT Depo-SubQ Provera 104For IM use only  
*** MEDICATION WASTE ***Product Size:  150 mgProduct Wasted:  ___ mg

Start Date: 17

Stop Date: 17

Status: Ordered



diphtheria/pertussis, acel/tetanus adult 2 units-15.5 mcg-5 units/0.5 mL intramu
scular suspension

0.5 mL, Route: IM, Drug Form: SUSP, Dosing Weight 95.455, kg, ONCALL, Start date
: 17 1:00:00 CDT, Duration: 1 doses or times

Notes: (Tdap ) For Adolecent and Adult use For IM Use.  Same as: Adacel (Tdap)

Start Date: 17

Stop Date: 17

Status: Discontinued



docusate

100 mg, 1 cap, Route: PO, Drug form: CAP, BID, Dosing Weight 95.455, kg, PRN Con
stipation, Start date: 17 0:20:00 CDT, Duration: 30 day, Stop date:  0:19:00 CDT

Notes: (Same as: Colace) (Do Not Crush)

Start Date: 17

Stop Date: 17

Status: Discontinued



docusate sodium 100 mg oral capsule

100 mg = 1 cap, PO, BID, PRN Constipation, # 30 cap, 0 Refill(s)

Start Date: 17

Stop Date: 17

Status: Deleted



docusate sodium 100 mg oral capsule

100 mg = 1 cap, PO, BID, PRN Constipation, # 30 cap, 0 Refill(s)

Start Date: 17

Status: Ordered



famotidine

20 mg, 2 mL, Route: IVP, Drug form: INJ, ONCALL, Dosing Weight 95.455, kg, Start
date: 17 14:00:00 CDT, Duration: 30 day, Stop date: 17 13:59:00 CDT

Notes: (Same as: Pepcid)Can be dilute in 5-10cc NS  IVP: Slow IV push over at le
ast 2 minutes.

Start Date: 4/3/17

Stop Date: 17

Status: Discontinued



famotidine (ANES)

Route: IV, Drug form: INJ, ONCE, Stop date: 17 0:14:00 CDT

Start Date: 17

Stop Date: 17

Status: Completed



fentaNYL (ANES)

Route: IV, Drug form: INJ, ONCE, Stop date: 17 0:04:00 CDT

Start Date: 17

Stop Date: 17

Status: Completed



fentaNYL (ANES)

Route: INTRATHECAL, Drug form: INJ, ONCE, Stop date: 17 0:14:00 CDT

Start Date: 17

Stop Date: 17

Status: Completed



ferrous sulfate 325 mg oral enteric coated tablet

325 mg = 1 tab, PO, Daily, # 60 tab, 3 Refill(s)

Start Date: 17

Status: Ordered



ferrous sulfate 325 mg oral enteric coated tablet

325 mg = 1 tab, PO, Daily, # 60 tab, 3 Refill(s)

Start Date: 17

Stop Date: 17

Status: Discontinued



Fioricet

2 tab, Route: PO, Drug Form: TAB, Dosing Weight 95.455, kg, ONCE, Start date:  0:51:00 CDT, Stop date: 17 0:51:00 CDT

Notes: (acetaminophen-butalbital-caffeine 325-50-40mg)  Do not exceed 4 gm/day o
f acetaminophen.  (Same as: Esgic, Fioricet)

Start Date: 17

Stop Date: 17

Status: Completed



gabapentin 100 mg oral capsule

200 mg = 2 cap, PO, Q6H, # 20 cap, 0 Refill(s)

Start Date: 17

Status: Ordered



GI cocktail

30 mL, Route: PO, Drug Form: SUSP, Dosing Weight 95.455, kg, ONCE, STAT, Start d
ate: 17 13:19:00 CDT, Stop date: 17 13:19:00 CDT

Notes: G.I. Cocktail = antacid with simethicone 22.5 mL - lidocaine viscous 7.5 
mL

Start Date: 4/3/17

Stop Date: 17

Status: Completed



ibuprofen

600 mg, 1 tab, Route: PO, Drug form: TAB, Q6H, Dosing Weight 95.455, kg, Start d
ate: 17 6:00:00 CDT, Duration: 30 day, Stop date: 17 0:00:00 CDT

Notes: (Same as: Motrin)"Do Not Crush"  Take with food.

Start Date: 17

Stop Date: 17

Status: Canceled



ibuprofen

600 mg, 1 tab, Route: PO, Drug form: TAB, Q6H, Dosing Weight 95.455, kg, PRN Oth
er -See Comment, Start date: 17 13:03:00 CDT, Duration: 30 day, Stop date:
17 13:02:00 CDT

Notes: (Same as: Motrin)"Do Not Crush"  Take with food.

Start Date: 4/3/17

Stop Date: 17

Status: Discontinued



ibuprofen

800 mg, 1 tab, Route: PO, Drug form: TAB, Q8H, Dosing Weight 95.455, kg, Start d
ate: 17 8:00:00 CDT, Duration: 30 day, Stop date: 17 0:00:00 CDT

Notes: (Same as: Motrin)"Do Not Crush"  Take with food.

Start Date: 17

Stop Date: 17

Status: Discontinued



ibuprofen 800 mg oral tablet

800 mg = 1 tab, PO, Q8H, # 30 tab, 0 Refill(s)

Start Date: 17

Stop Date: 17

Status: Deleted



ibuprofen 800 mg oral tablet

800 mg = 1 tab, PO, Q8H, # 30 tab, 0 Refill(s)

Start Date: 17

Stop Date: 17

Status: Ordered



influenza virus vaccine, inactivated

0.5 mL, Route: IM, Drug Form: SUSP, Daily, Start date: 17 9:00:00 CDT, Dur
ation: 1 doses or times, Stop date: 17 9:00:00 CDT

Notes: (Same as: Fluzone Quadrivalent, Fluarix Quadrivalent)For 3 years of age a
nd older (0.5 mL IM)Shake well before use

Start Date: 17

Stop Date: 17

Status: Pending Complete



ketAMINE (ANES)

Route: IV, Drug form: INJ, ONCE, Stop date: 17 23:44:00 CDT

Start Date: 17

Stop Date: 17

Status: Completed



ketOROLAC (ANES)

IV, ONCE

Start Date: 17

Stop Date: 17

Status: Completed



Lactated Ringers (Bolus) IV

1,000 mL, 1,000 ml/hr, Infuse Over: 1 hr, Route: IV, 1,000, Drug form: INJ, ONCE
, Dosing Weight 95.455 kg, Start date: 17 13:03:00 CDT, Stop date: 
7 13:03:00 CDT, Bolus for regional anesthesia per unit protocol

Start Date: 4/3/17

Stop Date: 4/3/17

Status: Completed



Lactated Ringers 1,000 mL

1,000 mL, Rate: 100 ml/hr, Infuse over: 10 hr, Route: IV, Dosing Weight 95.455 k
g, Total Volume: 1,000, Start date: 17 0:20:00 CDT, Duration: 30 day, Stop
date: 17 0:19:00 CDT

Start Date: 17

Stop Date: 17

Status: Discontinued



Lactated Ringers 1,000 mL

1,000 mL, Rate: 125 ml/hr, Infuse over: 8 hr, Route: IV, Dosing Weight 95.455 kg
, Total Volume: 1,000, Start date: 17 13:03:00 CDT, Duration: 30 day, Stop
date: 17 13:02:00 CDT

Start Date: 4/3/17

Stop Date: 17

Status: Discontinued



lanolin topical cream

1 appl, Route: TOP, PRN, Drug form: OINT, PRN Other -See Comment, Start date:  0:20:00 CDT, Duration: 30 day, Stop date: 17 0:19:00 CDT

Start Date: 17

Stop Date: 17

Status: Discontinued



lidocaine (ANES)

Route: EPIDURAL, Drug form: INJ, ONCE, Stop date: 17 23:39:00 CDT

Start Date: 17

Stop Date: 17

Status: Completed



lidocaine 1%

200 mg, 20 mL, Route: PERCUT, Drug Form: INJ, Dosing Weight 95.455, kg, PRN, PRN
Other -See Comment, Start date: 17 13:03:00 CDT, Duration: 1 doses or rosa
es, Stop date: 17 0:00:00 CDT

Notes: (Same as: Xylocaine)

Start Date: 4/3/17

Stop Date: 17

Status: Completed



lidocaine 1% injectable solution

0.25 mL, Route: INTRADERM, Drug Form: INJ, Dosing Weight 95.455, kg, PRN, PRN Ot
her -See Comment, Start date: 17 13:03:00 CDT, Duration: 30 day, Stop date
: 17 13:02:00 CDT

Notes: Preservative free.  (Same as: Xylocaine MPF)

Start Date: 4/3/17

Stop Date: 17

Status: Discontinued



Lomotil oral tablet

2 tab, Route: PO, Drug Form: TAB, Dosing Weight 95.455, kg, QID, PRN Loose Stool
s, Start date: 17 0:16:00 CDT, Duration: 30 day, Stop date: 17 0:15:
00 CDT

Notes: (Same As: Lomotil) MAX Adult dose = 8 tabs/day

Start Date: 17

Stop Date: 17

Status: Discontinued



LR 1000 mL INJ (ANES)

Route: IV, Total Volume: 1,000, Start date: 17 22:44:00 CDT, Stop date:  23:44:00 CDT

Start Date: 17

Stop Date: 17

Status: Completed



M-M-R II

0.5 mL, Route: SL, Drug Form: PDR/INJ, Dosing Weight 95.455, kg, ONCALL, Start d
ate: 17 1:00:00 CDT, Duration: 1 doses or times

Notes: (Same as: M-M-R II) (measles-mumps-rubella virus vaccine 0.5 ml INJ VL)WA
JUAN C: F/P - Red; E -Red  GIVE PRIOR TO DISCHARGE

Start Date: 17

Stop Date: 17

Status: Discontinued



methylergonovine

0.2 mg, 1 mL, Route: IM, Drug form: INJ, ONCALL, Dosing Weight 95.455, kg, Start
date: 17 14:00:00 CDT, Duration: 30 day, Stop date: 17 13:59:00 CDT

Notes: (Same as:Methergine)

Start Date: 4/3/17

Stop Date: 17

Status: Discontinued



metoclopramide (ANES)

Route: IV, Drug form: INJ, ONCE, Stop date: 17 0:14:00 CDT

Start Date: 17

Stop Date: 17

Status: Completed



midazolam (ANES)

Route: IV, Drug form: SOLN, ONCE, Stop date: 17 23:59:00 CDT

Start Date: 17

Stop Date: 17

Status: Completed



misoprostol

1,000 microgram, 5 tab, Route: ID, Drug form: TAB, ONCALL, Dosing Weight 95.455,
kg, Start date: 17 14:00:00 CDT, Duration: 1 doses or times, Stop date: 0
17 0:00:00 CDT

Notes: (Same as:Cytotec)   Take with food

Start Date: 4/3/17

Stop Date: 17

Status: Discontinued



misoprostol

25 microgram, 1 ea, Route: VAG, Drug form: TAB, ONCE, Dosing Weight 95.455, kg, 
Start date: 17 13:57:00 CDT, Stop date: 17 13:57:00 CDT

Notes: (Same as:Cytotec)  Take with food  25 microgram = 1/4 tab of 100 microgra
m.

Start Date: 4/3/17

Stop Date: 17

Status: Discontinued



morphine Sulfate

2 mg, 1 mL, Route: IVP, Drug form: INJ, Q2H, Dosing Weight 95.455, kg, PRN Pain 
Score 7-10, Start date: 17 22:26:00 CDT, Duration: 30 day, Stop date:  22:25:00 CDT

Notes: (Same as:MORPhine Sulfate)

Start Date: 17

Stop Date: 17

Status: Discontinued



morphine Sulfate (ANES)

Route: INTRATHECAL, Drug form: INJ, ONCE, Stop date: 17 0:14:00 CDT

Start Date: 17

Stop Date: 17

Status: Completed



NS-Oxytocin 30 units 500 ml 30 unit

30 unit, 500 mL, Rate: Titrate, Dosing Weight 95.455, kg, Route: IV, Total Volum
e: 500 mL, Start date: 17 18:41:00 CDT, Duration: 2 day, Stop date:  18:40:00 CDT, Replace Every: 24 hr

Start Date: 4/3/17

Stop Date: 17

Status: Discontinued



NS-Oxytocin 30 units 500 ml 30 unit

30 unit, 500 mL, Rate: 42 ml/hr, Infuse over: 11.9 hr, Dosing Weight 95.455, kg,
Route: IV, Total Volume: 500 mL, Start date: 17 0:20:00 CDT, Duration: 2 
day, Stop date: 17 0:19:00 CDT, Replace Every: 11.9 hr

Start Date: 17

Stop Date: 17

Status: Completed



NS-Oxytocin 30 units 500 ml 30 unit

30 unit, 500 mL, Rate: 42 ml/hr, Infuse over: 11.9 hr, Dosing Weight 95.455, kg,
Route: IV, Total Volume: 500 mL, Start date: 17 13:03:00 CDT, Duration: 2 
day, Stop date: 17 13:02:00 CDT, Replace Every: 11.9 hr

Start Date: 4/3/17

Stop Date: 17

Status: Completed



Ofirmev

1,000 mg, 100 mL, Route: IV, Drug form: INJ, ONCE, Dosing Weight 95.455, kg, PRN
Pain Score 1-3, for > or = 50 kg, Start date: 17 21:24:00 CDT

Notes: Infuse over 15 minutesDo not exceed 4gm/day of acetaminophen  *** MEDICAT
ION WASTE ***Product Size:  1000 mgProduct Wasted:  ___ mg

Start Date: 4/3/17

Stop Date: 4/3/17

Status: Completed



ondansetron

4 mg, 2 mL, Route: IVP, Drug form: INJ, Q8H, Dosing Weight 95.455, kg, PRN Nause
a & Vomiting, Start date: 17 22:26:00 CDT, Duration: 30 day, Stop date: 
17 22:25:00 CDT

Notes: (Same as: Zofran)  *** MEDICATION WASTE ***Product Size:  4 mgProduct Was
ginny:  ___ mg

Start Date: 17

Stop Date: 17

Status: Discontinued



ondansetron

4 mg, 2 mL, Route: IVP, Drug form: INJ, Q8H, Dosing Weight 95.455, kg, PRN Nause
a & Vomiting, Start date: 17 13:03:00 CDT, Duration: 30 day, Stop date: 
17 13:02:00 CDT

Notes: (Same as: Zofran)  *** MEDICATION WASTE ***Product Size:  4 mgProduct Was
ginny:  ___ mg

Start Date: 4/3/17

Stop Date: 17

Status: Discontinued



ondansetron (ANES)

Route: IV, Drug form: INJ, ONCE, Stop date: 17 0:14:00 CDT

Start Date: 17

Stop Date: 17

Status: Completed



oxytocin 30 unit

30 unit, 500 mL, Rate: Titrate, Dosing Weight 95.455, kg, Route: IV, Total Volum
e: 500 mL, Start date: 17 18:31:00 CDT, Duration: 2 day, Stop date:  18:30:00 CDT, Replace Every: 24 hr

Start Date: 4/3/17

Stop Date: 4/3/17

Status: Discontinued



penicillin G potassium

2,500,000 unit, 50 mL, Route: IVPB, Drug form: INJ, ABXQ4H, Dosing Weight 95.455
, kg, Start date: 17 2:00:00 CDT, Duration: 30 day, Stop date: 17 22
:00:00 CDT

Start Date: 17

Stop Date: 17

Status: Discontinued



penicillin G potassium 5,000,000 units injection

5,000,000 unit, Route: IVPB, Drug form: PDR/INJ, ONCALL, Dosing Weight 95.455, k
g, Start date: 17 22:00:00 CDT, Duration: 30 day, Stop date: 17 21:5
9:00 CDT

Notes: (Same as: Pfizerpen)  *** MEDICATION WASTE ***Product Size:  5,000,000 un
itProduct Wasted:  ___ unit

Start Date: 17

Stop Date: 17

Status: Completed



Pepcid 20 mg oral tablet

20 mg, 1 tab, Route: PO, Drug form: TAB, BID, Dosing Weight 95.455, kg, Start da
te: 17 17:00:00 CDT, Duration: 30 day, Stop date: 17 9:00:00 CDT

Notes: (Same as: Pepcid)

Start Date: 4/3/17

Stop Date: 17

Status: Discontinued



Pitocin (ANES) (ANES) +

Route: IV, Drug form: SOLN, Dosing Weight 95.5, kg, Start date: 17 23:20:0
0 CDT, Stop date: 17 0:20:00 CDT

Start Date: 17

Stop Date: 17

Status: Completed



Prenatal Multivitamins oral tablet

1 tab, Route: PO, Drug Form: TAB, Dosing Weight 95.455, kg, Daily, Start date: 0
17 9:00:00 CDT, Duration: 30 day, Stop date: 17 9:00:00 CDT

Start Date: 17

Stop Date: 17

Status: Discontinued



Prenatal Multivitamins with Folic Acid 0.4 mg oral tablet

1 tab, PO, Daily, # 40 tab, 0 Refill(s)

Start Date: 17

Stop Date: 17

Status: Deleted



Prenatal Multivitamins with Folic Acid 0.4 mg oral tablet

1 tab, PO, Daily, # 40 tab, 0 Refill(s)

Start Date: 17

Status: Ordered



simethicone

160 mg, 2 tab, Route: PO, Drug form: CHEWTAB, Q8H, Dosing Weight 95.455, kg, PRN
Gas, Start date: 17 0:20:00 CDT, Duration: 30 day, Stop date: 17 0:
19:00 CDT

Notes: (Same as: Mylicon)

Start Date: 17

Stop Date: 17

Status: Discontinued



sodium bicarbonate (ANES)

Route: EPIDURAL, Drug form: INJ, ONCE, Stop date: 17 23:44:00 CDT

Start Date: 17

Stop Date: 17

Status: Completed



sodium citrate (ANES)

Route: PO, Drug Form: INJ, ONCE, Stop date: 17 0:14:00 CDT

Start Date: 17

Stop Date: 17

Status: Completed



terbutaline

0.25 mg, 0.25 mL, Route: SUB-Q, Drug form: INJ, PRN, Dosing Weight 95.455, kg, P
RN Other -See Comment, Start date: 17 13:03:00 CDT, Duration: 1 doses or t
imes, Stop date: 17 0:00:00 CDT

Notes: **DO NOT USE IN OB/GYN AREA**(Same As: Brethine)

Start Date: 4/3/17

Stop Date: 17

Status: Completed



tramadol

50 mg, 1 tab, Route: PO, Drug form: TAB, Q4H, Dosing Weight 95.455, kg, PRN Pain
Score 4-6, Start date: 17 13:03:00 CDT, Duration: 48 hr, Stop date:  13:02:00 CDT

Notes: Not to exceed 400mg/day. (Same As: Ultram)

Start Date: 4/3/17

Stop Date: 17

Status: Discontinued



tramadol 50 mg oral tablet

100 mg, 2 tab, Route: PO, Drug form: TAB, BID, PRN Pain Score 7-10, Start date: 
17 15:50:00 CDT, Duration: 30 day, Stop date: 17 15:49:00 CDT

Notes: Not to exceed 400mg/day. (Same As: Ultram)

Start Date: 17

Stop Date: 17

Status: Discontinued



Tylenol with Codeine #3 oral tablet

2 tab, Route: PO, Drug Form: TAB, Dosing Weight 95.455, kg, Q6H, Start date:  6:00:00 CDT, Duration: 30 day, Stop date: 17 0:00:00 CDT

Notes: Do not exceed 4gm/day of acetaminophen.  (Same as: Tylenol with Codeine #
3)

Start Date: 17

Stop Date: 17

Status: Discontinued



Tylenol with Codeine #3 oral tablet

2 tab, PO, Q6H, # 24 tab, 0 Refill(s)

Start Date: 17

Stop Date: 17

Status: Deleted



Tylenol with Codeine #3 oral tablet

2 tab, PO, Q6H, # 24 tab, 0 Refill(s)

Start Date: 17

Stop Date: 4/15/17

Status: Ordered



zolpidem

5 mg, 1 tab, Route: PO, Drug form: TAB, Bedtime, Dosing Weight 95.455, kg, PRN I
nsomnia, Start date: 17 0:20:00 CDT, Duration: 30 day, Stop date: 17
0:19:00 CDT

Notes: (Same As: Ambien)

Start Date: 17

Stop Date: 17

Status: Discontinued



Results





BLOOD BANK RESULTS



  



                     Most recent to      1                   2



                                         oldest [Reference  



                                         Range]:  

 

  



                     ABO/Rh              O POS               O POS



                           *Unknown*                 *Unknown*



                           (17 7:50 AM)          (4/3/17 1:14 PM)

 

  



                     Antibody Scrn       Negative            Negative



                           (17 7:50 AM)          (4/3/17 1:14 PM)







CHEM PANEL



  



                     Most recent to      1                   2



                                         oldest [Reference  



                                         Range]:  

 

  



                           Creatinine Lvl            0.50 mg/dL 



                           [0.50-1.40 mg/dL]         (4/3/17 11:21 AM) 

 

  



                           eGFR                      153 mL/min/1.73m2 1 



                                         *NA* 



                                         (4/3/17 11:21 AM) 

 

  



                           Uric Acid [2.5-7.0        3.0 mg/dL 



                           mg/dL]                    (4/3/17 11:21 AM) 

 

  



                           ALT [0-65 unit/L]         13 unit/L 



                                         (4/3/17 11:21 AM) 

 

  



                           AST [0-37 unit/L]         15 unit/L 



                                         (4/3/17 11:21 AM) 

 

  



                           LDH [ unit/L]       200 unit/L 



                                         *HI* 



                                         (4/3/17 11:21 AM) 







1Result Comment: The eGFR is calculated using the CKD-EPI formula. In most 
young, healthy individuals the eGFR will be >90 mL/min/1.73m2. The eGFR declines
with age. An eGFR of 60-89 may be normal in some populations, particularly the 
elderly, for whom the CKD-EPI formula has not been extensively validated. Use of
the eGFR is not recommended in the following populations:



Individuals with unstable creatinine concentrations, including pregnant patients
and those with serious co-morbid conditions.



Patients with extremes in muscle mass or diet. 



The data above are obtained from the National Kidney Disease Education Program (
NKDEP) which additionally recommends that when the eGFR is used in patients with
extremes of body mass index for purposes of drug dosing, the eGFR should be mul
tiplied by the estimated BMI.



DRUG SCREEN



  



                     Most recent to      1                   2



                                         oldest [Reference  



                                         Range]:  

 

  



                           U Amph Scr                Negative 



                           [Negative]                *NA* 



                                         (4/3/17 1:14 PM) 

 

  



                           U Shahida Scr                Positive 



                           [Negative]                *ABN* 



                                         (4/3/17 1:14 PM) 

 

  



                           U Benzodia Scr            Negative 



                           [Negative]                *NA* 



                                         (4/3/17 1:14 PM) 

 

  



                           U Cocaine Scr             Negative 



                           [Negative]                *NA* 



                                         (4/3/17 1:14 PM) 

 

  



                           U Opiate Scr              Negative 



                           [Negative]                *NA* 



                                         (4/3/17 1:14 PM) 

 

  



                           U Phencyc Scr             Negative 



                           [Negative]                *NA* 



                                         (4/3/17 1:14 PM) 

 

  



                           U Cannab Scr              Negative 



                           [Negative]                *NA* 



                                         (4/3/17 1:14 PM) 

 

  



                           UDS Note                  See Note 



                                         (4/3/17 1:14 PM) 







URINE CHEM



  



                     Most recent to      1                   2



                                         oldest [Reference  



                                         Range]:  

 

  



                           U Creatinine              31.30 mg/dL 



                                         *NA* 



                                         (4/3/17 11:21 AM) 

 

  



                           U Protein                 5.4 mg/dL 



                                         *NA* 



                                         (4/3/17 11:21 AM) 

 

  



                           U Prot/Creat              0.2 



                                         *NA* 



                                         (4/3/17 11:21 AM) 







IMMUNOLOGY



  



                     Most recent to      1                   2



                                         oldest [Reference  



                                         Range]:  

 

  



                           Treponemal Scr [Non       Reactive 



                           Reactive]                 *ABN* 



                                         (4/3/17 12:22 PM) 

 

  



                           RPR [Non Reactive]        Non Reactive 



                                         (4/3/17 12:22 PM) 

 

  



                           T pallidum Ab [Non        Reactive 



                           Reactive]                 *ABN* 



                                         (4/3/17 12:22 PM) 

 

  



                           HIV. [Negative]           Negative 



                                         *NA* 



                                         (4/3/17 12:22 PM) 

 

  



                           Hep Bs Ag [Negative]      Negative 



                                         *NA* 



                                         (4/3/17 1:14 PM) 







HEMATOLOGY



  



                     Most recent to      1                   2



                                         oldest [Reference  



                                         Range]:  

 

  



                           WBC [3.7-10.4 K/CMM]      7.2 K/CMM 



                                         (4/3/17 11:21 AM) 

 

  



                           RBC [4.20-5.40            4.14 M/CMM 



                           M/CMM]                    *LOW* 



                                         (4/3/17 11:21 AM) 

 

  



                     Hgb [12.0-16.0 g/dL]  8.5 g/dL            10.8 g/dL



                           *LOW*                     *LOW*



                           (17 5:36 AM)          (4/3/17 11:21 AM)

 

  



                     Hct [36.0-48.0 %]   26.5 %              34.0 %



                           *LOW*                     *LOW*



                           (17 5:36 AM)          (4/3/17 11:21 AM)

 

  



                           MCV [80.0-98.0 fL]        82.2 fL 



                                         (4/3/17 11:21 AM) 

 

  



                           MCH [27.0-31.0 pg]        26.2 pg 



                                         *LOW* 



                                         (4/3/17 11:21 AM) 

 

  



                           MCHC [32.0-36.0           31.9 g/dL 



                           g/dL]                     *LOW* 



                                         (4/3/17 11:21 AM) 

 

  



                           RDW [11.5-14.5 %]         15.9 % 



                                         *HI* 



                                         (4/3/17 11:21 AM) 

 

  



                           Platelet [133-450         150 K/CMM 



                           K/CMM]                    (4/3/17 11:21 AM) 

 

  



                           MPV [7.4-10.4 fL]         9.6 fL 



                                         (4/3/17 11:21 AM) 

 

  



                           Segs [45.0-75.0 %]        70.7 % 



                                         (4/3/17 11:21 AM) 

 

  



                           Lymphocytes               21.1 % 



                           [20.0-40.0 %]             (4/3/17 11:21 AM) 

 

  



                           Monocytes [2.0-12.0       6.9 % 



                           %]                        (4/3/17 11:21 AM) 

 

  



                           Eosinophils [0.0-4.0      1.0 % 



                           %]                        (4/3/17 11:21 AM) 

 

  



                           Basophils [0.0-1.0        0.3 % 



                           %]                        (4/3/17 11:21 AM) 

 

  



                           Segs-Bands #              5.1 K/CMM 



                           [1.5-8.1 K/CMM]           (4/3/17 11:21 AM) 

 

  



                           Lymphocytes #             1.5 K/CMM 



                           [1.0-5.5 K/CMM]           (4/3/17 11:21 AM) 

 

  



                           Monocytes # [0.0-0.8      0.5 K/CMM 



                           K/CMM]                    (4/3/17 11:21 AM) 

 

  



                           Eosinophils #             0.1 K/CMM 



                           [0.0-0.5 K/CMM]           (4/3/17 11:21 AM) 







Immunizations





No data available for this section



Procedures





No data available for this section



Social History





 



                           Social History Type       Response

 

 



                           Alcohol                   Past, Type Wine, Liquor.

 

 



                           Smoking Status            Former smoker; Number of ye

ars: 10; Started [age] age: 16.0; 

Stopped at age:



                                         1; Exposure to Tobacco Smoke None; Ciga

rette Smoking Last 365 Days Yes; Reg



                                         Smoking Cessation Counseling Yes







Assessment and Plan

Extracted from:



  



                     Title: Clinical Document  Author: Myra Daniels MD 

 Date: 17









R1 OB/GYN Pospartum note





S: Pt feeling well. Pain is better controlled with current medications. More on 
ambulating 4/10. Pt tolerating regular diet. Voiding freely. Ambulating.



O: VitalsTmp(F)Tmp(C)[id number]

                                         04:[id number]/84---8918---

------

                                         00:2898.236.44vyfr077/65---7218---

------

                                         20:0098.036.52rqec279/89---[id number]

------

                                         16:0398.036.77clgu787/84---9118---

------

                                         12:2898.336.35ayfs059/76---9018---

------



                                        24 Hr Tmax: 98.7F (37.06c) at  04:2

824 Hr Tmin:  98.0F (36.67c) at  

20:00

                                        36 Hr Tmax: 98.7F (37.06c) at  04:2

836 Hr Tmin:  97.9F (36.61c) at  

00:30

Vital Signs are the last 5 in the past 48 hours.    Weights are the last 5 in 60
days, plus initial.



DateWt(kg)Wt(lb)Ht(cm)Ht(in)MethodBMIBSA

                                         (initial) 95.45 210.00Measured 38.

52.04

                                        57.48 62.00Stated





Gen: NAD

CV: RRR

Resp: CTAB

GI: soft/ fundus firm at umbilicus/ nontender/ nondistended/ normoactive BS

Incision: C/D/I

: lochia scant,

Ext: no c/c/e. SCDs/ TEDs in place





A/P: 26 y.o , s/p pLTCS 2/2 FIOL



                                        1)     POD#3: AFVSS

                                        2)     Pain: Controlled with current med

ications. Dc home with Ibuprofen, 

Tramadol and Gabapentin.

                                        3)     Heme: 10.8 > 2000 cc EBL + hemaba

te x 1 > 8.5. No s/sx of anemia 

currently.

                                        4)     GI/: [place] regular diet. Voding f

mariana.

                                        5)     Rh+/ rubella immune/ HIV neg / He

pBSag neg / RPR NR / BCM: Depo, ordered 

/ Breastfeeding

                                        6)     Seizure disorder: Last seizure at

 age 17. No symptoms currently.

                                        7)     H/o syphilis: RPR NR on this admi

ssion.

                                        8)     Obesity: BMI 39. Ambulating.

                                        9)     GHTN: Dx by elevated BPs > 4 hrs 

apart. UPCr 0.2, preE panel negative. No

s/sx of PreE. Currently normotensive.

                                        10)   DC home with one week F/U for BP c

heck.



Myra Suarez MD

OB/GYN, PGY1







 



                           Addendum                  Attending Physician Attesta

tion:  I agree with stated by resident 

today.





                           by Burnett,                  No complaints, Normal lochi

a, pain controlled



                           Noemí                    VSS, Afebrile, good uop





                           Frank LOVING                 Exam benign





                           on                        Abd soft, FF @ umb, Inc c/d

/i



                           2017                Ext: No calf tenderness





                           12:18                     A/P:  27yo POD #  3  s/pc/s



                                         1. Postop-  routine advances, continue 

post-op care.



                                         2.  PNL WNL



                                         3.  Post op H/H-acute on chronic blood 

loss anemia- asymptomatic, Fe on d/c



                                         4.  See resident note for details.



                                         Anticipate d/c home today or tomorrow.

 

 



                           Addendum                  Discharge summary confirmat

ion #[id number]



                                         by 



                                         Myra Daniels MD 



                                         on 



                                         2017 



                                         19:28 





Extracted from:



  



                     Title: Clinical Document  Author: Elvin Sanders MD 

 Date: 4/3/17







                                        OB Evaluation > 20 Weeks *ED



Patient:   JERMAINE MARLEY            MRN: [id number]            FIN: 
[id number]

Age:   26 years     Sex:  Female     :  1990

Associated Diagnoses:   None

Author:   Veronika Matamoros MD



History of Present Illness

[facility]

[facility]

Department of Obstetrics

History and Physical

LMP: 2016

EDC: 2017

EGA: 38w1d



CC: elevated BP at pharmacy



HPI: 27yo  at 38w1d by LMP c/w doc 18wk stu presents for evaluation of 
elevated BPs at pharmacy today 174/103.  Denies HA, scotomata, RUQ/Epigastric 
pain.  Endorses swelling of LEs.

Patient reports + FM.

Patient denies  painful contractions, LOF, VB, discharge, pruritus, dysuria.



PNC: Dr. Shook since 20 wks x 3 visits, YELITZA to [facility] at 27wk, last appointment
on 2017, next appointment on 2017.

* Dated as above.

* Hospitalizations: none

* Complications: none

* PNL: O+/Ab (-), RI, HBsAG neg, RPRNR, 1THIV neg, GC/CT neg, 1h  , H/H 
11.2/34.1, plt 162, GBS n/a, pap negative, quad neg

* US:

                                        --MFM sono, 25wks, posterior fundal plac

enta, no previa, female, EFW 23rd %tile,

VENANCIO 17.8cm, nl anatomy 

* Female infant

* Bottlefeeding / desires epidural / accepts blood transfusion / BCM: Depo shot

* Obesity affecting pregnancy , with BMI of 38

* Comorbidities:

                                        1. H/o preE w/ SF: In both prior pregnan

cies. Was induced at 35wks and 37wks due

to preE w/ SF. Never on aspirin.

                                        - Baseline labs ALT/AST , UPCR 0.2, 

uric acid 2.4, , Cr 0.5.

                                        2. H/o PTB: At 35wks in , indicated 

IOL for preE. No 17-OHP indicated.

                                        3. Seizure disorder: Dx in childhood fir

st seizure at age 14, last seizure at 

age 17. Was on Topomax, which patient self discontinued at age 17y due to weight
loss.

                                        - Not followed by Neurology.

                                        4. H/o syphilis: Dx in . Pt was santo

ginny with 3 doses of IM PCN, states she 

was re-treated in . Current partner tested negative per pt.

                                        5. 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

o need for suppresive therapy.

                                        6. Pruritus: Pt c/o itching of her legs,

 especially when she wears tights or 

pants. Resolved now. Denies itching anywhere else on her body. Bile acids 1.2 
and LFTs ALT/AST 



OB History: 

                                        : 35wks, IOL 2/2 preE w/ SF. . Fe

male, no complications

                                        2013: ETOP, D&C

                                        2014: 37wks, IOL 2/2 preE w/ SF. . Fe

male, no complications



GYN History: 13yo/q1-2month/5-7d, h/o syphillis in  sp tx; Denies h/o 
abnormal papsmears, Denies h/o STDs including chlamydia, gonorrhea, herpes, 
trichomonas

PMH: as above; denies h/o asthma, diabetes, hypertension, thyroid disease, 
cancer

PSH: D&C x1

Meds: none

Allergies: NKDA

Family History: some cancer, unsure type

Social History:  denies tobacco, alcohol, drugs. Stopped tobacco when she 
discovered she was pregnant.  Lives with children and FOB, denies domestic 
abuse, feels safe at home.



Physical Exam

VitalsTmp(F)[id number]

                                         11:45----745611/93--------

                                         11:30--------------100---

                                         11:15----31560/89--98---

                                         11:00----54901/91--------

                                         10:[id number]/[id number]---

                                        24 Hr Tmax: 97.6F (36.44c) at  10:4

7Vital Signs are the last 5 in the past 

48 hours.



Gen:NAD, A&O x3

Neck: no LAD, thyroid wnl

CV: RRR, 2+ pulses b/l upper and lower extremities

Pulm:CTAB

Abd:soft, NTTP, + BS, gravid

Ext: calves non-ttp, no c/c/e

SVE: //hi , at 1200 hour, Baum score: 0/13

SSE: NEFG, no lesions on vulva, vagina, or cervix.  Thick white physiologic 
discharge, no bleeding, no pooling with valsalva.

FHT: baseline 150s , + accelerations, - decelerations,  moderate variability, 
reactive strip

[place]: quiet

US: SIUP, cephalic, posterior/rt lateral placenta, no previa, DVP 4.4cm,  EFW 
2866g by Dr. Matamoros



Labs:

plt 150/ ALT/AST 13/15

UPCR pending

                                        3THIV pending

                                        3T RPR pending

GBS pending



Assessment & Plan: 27yo  at 38w1d by LMP c/w doc 18wk sono presents with 
mild range BPs



                                        1. Suspected Gestational HTN, r/o pre-ec

lampsia --> admit for IOL

                                        -- Baseline labs: ALT/AST 13/9, UPCR 0.2

, uric acid 2.4, , Cr 0.5, plt 

162.

                                        -- Labs today: pending, plt 150

                                        -- h/o preE in prior pregnancies, never 

on aspirin



                                        2. SIUP: Category I Strip, female infant

                                        -- US: cephalic, post/rt/lat placenta, 2

866g by sono

                                        -- Patient reports no complications duri

ng pregnancy or with sono.



                                        3. PNL: RH+, RI, HBsAg neg, RPRNR, 1THIV

 neg, GBS pending, unk, no RF / 3THIV 

pending

                                        4. Bottlefeeding / desires epidural / ac

cepts blood transfusion / BCM: Depo shot

                                        5. H/o PTB: At 35wks in , indicated 

IOL for preE. No 17-OHP indicated.

                                        6. Seizure disorder: Dx in childhood fir

st seizure at age 14, last seizure at 

age 17. Was on Topomax, which patient self discontinued at age 17y due to weight
loss.

                                        - Not followed by Neurology.

                                        7. H/o syphilis: Dx in . Pt was santo

ginny with 3 doses of IM PCN, states she 

was re-treated in . Current partner tested negative per pt.

                                        8. 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

o need for suppresive therapy. NO 

lesions on speculum exam. Patient denies prodromal symptoms



Dispo: Admit for IOL given gestational HTN, r/o PreE.



Patient discussed with Dr. Benitez, R4.



Veronika Matamoros M.D. | #[id number]

Resident Physician | PGY2

Department of Obstetrics, Gynecology & Reproductive Sciences

[facility]

  
________________________________________________________________________________

_________________



R4 Addendum



                                        27yo  at 38w1d dated by LMP c/w d

oc 18wk sono with GHTN, seizure 

disorder, h/o PTB, h/o syphillis, HSV2+, scant PNC admitted for IOL 2/2 GHTN.

                                        1. IOL 2/2 GHTN: Admit to L&D. Prepare f

or . Sent PNL. Will place Cook 

balloon with cytotec.

                                        2. FHTs Cat 1

                                        3. 3T HIV pending, GBS unk- no risk fact

ors, so no PCN is indicated

                                        4. cepahlic, 3000g

                                        5. WE

                                        6. GHTN: Denies PreE sx. BPs mild range.

 PreE panel neg. UPCR 0.2. Will continue

to monitor BPs.

                                        7. Seizure disorder: Stable. No meds.

                                        8. H/o indicated PTB at 35wks 2/2 PreE.

                                        9. H/o syphillis: s/p tmt. RPR NR. Admit

 trep screen pending.

                                        10. HSV2+: No suppression. Denies prodro

mal sx. No lesions on spec exam.

                                        11. Scant PNC: sent UDS. Will need SW pp

.

                                        12. Pt was discussed with Dr. Montalvo.



Isabela Benitez, PGY4





Attending Note

Pt seen and discussed with Ob team, agree with plan.

Pt with elevated blood pressure at term, gestational hypertension, admit for IOL



Elvin Sanders MD

## 2020-08-18 NOTE — DIAGNOSTIC IMAGING REPORT
Radiographs of the left knee - 3 views



HISTORY:  Pain

COMPARISON: None available.

     

FINDINGS:

Bones:

No acute displaced fracture.  

Osseous alignment is within normal limits.



Joints:

Mild tricompartmental degenerative arthrosis. No osseous erosion. 



Soft tissues:

Mild soft tissue swelling





IMPRESSION: 

Mild tricompartmental degenerative arthrosis. No osseous erosion. 



Mild soft tissue swelling.



Signed by: Dr. Ruddy Monaco M.D. on 8/18/2020 12:57 PM